# Patient Record
Sex: MALE | Race: WHITE | NOT HISPANIC OR LATINO | URBAN - METROPOLITAN AREA
[De-identification: names, ages, dates, MRNs, and addresses within clinical notes are randomized per-mention and may not be internally consistent; named-entity substitution may affect disease eponyms.]

---

## 2019-07-14 ENCOUNTER — OFFICE VISIT (OUTPATIENT)
Dept: URGENT CARE | Facility: CLINIC | Age: 59
End: 2019-07-14
Payer: COMMERCIAL

## 2019-07-14 VITALS
RESPIRATION RATE: 18 BRPM | HEIGHT: 69 IN | BODY MASS INDEX: 29.15 KG/M2 | SYSTOLIC BLOOD PRESSURE: 128 MMHG | DIASTOLIC BLOOD PRESSURE: 78 MMHG | HEART RATE: 87 BPM | TEMPERATURE: 99 F | WEIGHT: 196.8 LBS | OXYGEN SATURATION: 100 %

## 2019-07-14 DIAGNOSIS — L50.9 URTICARIA OF ENTIRE BODY: Primary | ICD-10-CM

## 2019-07-14 PROCEDURE — 99213 OFFICE O/P EST LOW 20 MIN: CPT | Performed by: FAMILY MEDICINE

## 2019-07-14 RX ORDER — ASPIRIN 81 MG/1
81 TABLET ORAL DAILY
COMMUNITY

## 2019-07-14 RX ORDER — SERTRALINE HYDROCHLORIDE 100 MG/1
100 TABLET, FILM COATED ORAL DAILY
COMMUNITY
Start: 2019-06-29

## 2019-07-14 RX ORDER — LEVOTHYROXINE SODIUM 0.07 MG/1
75 TABLET ORAL
COMMUNITY
Start: 2019-06-30

## 2019-07-14 RX ORDER — ATORVASTATIN CALCIUM 80 MG/1
TABLET, FILM COATED ORAL
COMMUNITY
Start: 2019-06-30 | End: 2022-06-17 | Stop reason: SDUPTHER

## 2019-07-15 ENCOUNTER — APPOINTMENT (OUTPATIENT)
Dept: LAB | Facility: CLINIC | Age: 59
End: 2019-07-15
Payer: COMMERCIAL

## 2019-07-15 DIAGNOSIS — L50.9 URTICARIA OF ENTIRE BODY: ICD-10-CM

## 2019-07-15 PROCEDURE — 86617 LYME DISEASE ANTIBODY: CPT

## 2019-07-15 PROCEDURE — 36415 COLL VENOUS BLD VENIPUNCTURE: CPT

## 2019-07-15 PROCEDURE — 86618 LYME DISEASE ANTIBODY: CPT

## 2019-07-16 LAB
B BURGDOR IGG SER IA-ACNC: 0.18
B BURGDOR IGM SER IA-ACNC: 17.92

## 2019-07-17 DIAGNOSIS — A69.20 LYME DISEASE: Primary | ICD-10-CM

## 2019-07-17 LAB
B BURGDOR IGG PATRN SER IB-IMP: NEGATIVE
B BURGDOR IGM PATRN SER IB-IMP: POSITIVE
B BURGDOR18KD IGG SER QL IB: ABNORMAL
B BURGDOR23KD IGG SER QL IB: PRESENT
B BURGDOR23KD IGM SER QL IB: PRESENT
B BURGDOR28KD IGG SER QL IB: PRESENT
B BURGDOR30KD IGG SER QL IB: ABNORMAL
B BURGDOR39KD IGG SER QL IB: ABNORMAL
B BURGDOR39KD IGM SER QL IB: PRESENT
B BURGDOR41KD IGG SER QL IB: PRESENT
B BURGDOR41KD IGM SER QL IB: PRESENT
B BURGDOR45KD IGG SER QL IB: ABNORMAL
B BURGDOR58KD IGG SER QL IB: ABNORMAL
B BURGDOR66KD IGG SER QL IB: ABNORMAL
B BURGDOR93KD IGG SER QL IB: ABNORMAL

## 2019-07-17 RX ORDER — DOXYCYCLINE HYCLATE 100 MG/1
100 CAPSULE ORAL EVERY 12 HOURS SCHEDULED
Qty: 28 CAPSULE | Refills: 0 | Status: SHIPPED | OUTPATIENT
Start: 2019-07-17 | End: 2019-07-31

## 2019-07-29 ENCOUNTER — TELEPHONE (OUTPATIENT)
Dept: FAMILY MEDICINE CLINIC | Facility: CLINIC | Age: 59
End: 2019-07-29

## 2019-07-29 NOTE — TELEPHONE ENCOUNTER
PT HAD WENT TO [facility] ON July 15 AND WAS TESTED FOR LYME AND IT WAS POSITIVE   HE IS ON ANTIBIOTICS AND IS ALMOST DONE AND ASKED IF YOU COULD PUT ANOTHER ORDER IN TO RETEST FOR THE LYME  PLS ADVISE

## 2019-07-29 NOTE — TELEPHONE ENCOUNTER
We have NEVER seen this patient before   Is he going to be a patient?  Is this even a correct patient

## 2019-08-06 ENCOUNTER — OFFICE VISIT (OUTPATIENT)
Dept: FAMILY MEDICINE CLINIC | Facility: CLINIC | Age: 59
End: 2019-08-06
Payer: COMMERCIAL

## 2019-08-06 VITALS
HEART RATE: 76 BPM | DIASTOLIC BLOOD PRESSURE: 70 MMHG | SYSTOLIC BLOOD PRESSURE: 120 MMHG | HEIGHT: 69 IN | OXYGEN SATURATION: 97 % | WEIGHT: 194.2 LBS | RESPIRATION RATE: 12 BRPM | BODY MASS INDEX: 28.76 KG/M2

## 2019-08-06 DIAGNOSIS — Z12.5 PROSTATE CANCER SCREENING: ICD-10-CM

## 2019-08-06 DIAGNOSIS — E78.00 PURE HYPERCHOLESTEROLEMIA: ICD-10-CM

## 2019-08-06 DIAGNOSIS — E03.9 ACQUIRED HYPOTHYROIDISM: ICD-10-CM

## 2019-08-06 DIAGNOSIS — Z12.11 COLON CANCER SCREENING: ICD-10-CM

## 2019-08-06 DIAGNOSIS — A69.20 LYME DISEASE: ICD-10-CM

## 2019-08-06 DIAGNOSIS — I25.810 CORONARY ARTERY DISEASE INVOLVING CORONARY BYPASS GRAFT OF NATIVE HEART WITHOUT ANGINA PECTORIS: ICD-10-CM

## 2019-08-06 DIAGNOSIS — F33.42 RECURRENT MAJOR DEPRESSIVE DISORDER, IN FULL REMISSION (HCC): Primary | ICD-10-CM

## 2019-08-06 PROCEDURE — 3008F BODY MASS INDEX DOCD: CPT | Performed by: FAMILY MEDICINE

## 2019-08-06 PROCEDURE — 1036F TOBACCO NON-USER: CPT | Performed by: FAMILY MEDICINE

## 2019-08-06 PROCEDURE — 99204 OFFICE O/P NEW MOD 45 MIN: CPT | Performed by: FAMILY MEDICINE

## 2019-08-06 NOTE — PROGRESS NOTES
Assessment/Plan:    No problem-specific Assessment & Plan notes found for this encounter  Diagnoses and all orders for this visit:    Recurrent major depressive disorder, in full remission ([facility] [address] )  Comments:  continue Zoloft as directed    Lyme disease  Comments:  done with doxycylcine   continue to monitor     Pure hypercholesterolemia  -     Lipid Panel with Direct LDL reflex; Future  -     Comprehensive metabolic panel  -     Ambulatory referral to Cardiology    Acquired hypothyroidism  -     CBC and differential  -     Comprehensive metabolic panel  -     TSH, 3rd generation with Free T4 reflex; Future    Prostate cancer screening  -     PSA, total and free; Future    Coronary artery disease involving coronary bypass graft of native heart without angina pectoris  -     Ambulatory referral to Cardiology  -     Echo stress test w contrast if indicated; Future    Colon cancer screening  -     Ambulatory referral to Gastroenterology; Future      BMI Counseling: Body mass index is 28 63 kg/m²  Discussed the patient's BMI with him  The BMI is above average  BMI counseling and education was provided to the patient  Nutrition recommendations include decreasing overall calorie intake, reducing fast food intake and consuming healthier snacks  Exercise recommendations include moderate aerobic physical activity for 150 minutes/week  Subjective:      Patient ID: Vivian Timmons is a 63 y o  male  Here as a new patient   Has been on zoloft for 20 years  Recently diagnosed with Lyme disease last month   Moved from [place] recently   Had 4 bypass surgery of the heart   Last seen by cardiology 1 year ago     Hyperlipidemia   This is a new problem  The current episode started more than 1 year ago  The problem is controlled  He has no history of chronic renal disease, diabetes, hypothyroidism, liver disease, obesity or nephrotic syndrome   Pertinent negatives include no chest pain, focal sensory loss, focal weakness, leg pain, myalgias or shortness of breath  The current treatment provides mild improvement of lipids  There are no compliance problems  Anxiety   Presents for follow-up visit  Patient reports no chest pain, compulsions, confusion, excessive worry, feeling of choking, hyperventilation, impotence, insomnia, irritability, nervous/anxious behavior, obsessions, palpitations, panic, restlessness or shortness of breath  The following portions of the patient's history were reviewed and updated as appropriate: allergies, current medications, past family history, past medical history, past social history, past surgical history and problem list     Review of Systems   Constitutional: Negative  Negative for irritability  HENT: Negative  Eyes: Negative  Respiratory: Negative  Negative for shortness of breath  Cardiovascular: Negative  Negative for chest pain and palpitations  Gastrointestinal: Negative  Genitourinary: Negative for impotence  Musculoskeletal: Negative for myalgias  Neurological: Negative for focal weakness  Psychiatric/Behavioral: Negative for confusion  The patient is not nervous/anxious and does not have insomnia  Objective:      /70 (BP Location: Right arm, Patient Position: Sitting, Cuff Size: Standard)   Pulse 76   Resp 12   Ht 5' 9 06" (1 754 m)   Wt 88 1 kg (194 lb 3 2 oz)   SpO2 97%   BMI 28 63 kg/m²          Physical Exam   Constitutional: He is oriented to person, place, and time  He appears well-developed and well-nourished  Eyes: Pupils are equal, round, and reactive to light  EOM are normal    Neck: No tracheal deviation present  No thyromegaly present  Cardiovascular: Normal rate and regular rhythm  Pulmonary/Chest: Effort normal and breath sounds normal    Abdominal: Soft  Bowel sounds are normal  He exhibits no distension  Musculoskeletal: Normal range of motion  He exhibits no edema or deformity     Neurological: He is alert and oriented to person, place, and time     Skin:   Surgical scars bilateral lower legs

## 2019-09-19 ENCOUNTER — TELEPHONE (OUTPATIENT)
Dept: GASTROENTEROLOGY | Facility: CLINIC | Age: 59
End: 2019-09-19

## 2019-10-11 ENCOUNTER — ANESTHESIA EVENT (OUTPATIENT)
Dept: GASTROENTEROLOGY | Facility: MEDICAL CENTER | Age: 59
End: 2019-10-11

## 2019-10-11 DIAGNOSIS — Z12.11 COLON CANCER SCREENING: Primary | ICD-10-CM

## 2019-10-11 NOTE — TELEPHONE ENCOUNTER
Colon scheduled with Dr Jaiyeola at [facility] on 10/30/2019  I gave patient verbal instructions/emailed to [email or web address]  com  Prep Suprep

## 2019-10-30 ENCOUNTER — HOSPITAL ENCOUNTER (OUTPATIENT)
Dept: GASTROENTEROLOGY | Facility: MEDICAL CENTER | Age: 59
Setting detail: OUTPATIENT SURGERY
Discharge: HOME | End: 2019-10-30
Admitting: INTERNAL MEDICINE
Payer: COMMERCIAL

## 2019-10-30 ENCOUNTER — ANESTHESIA (OUTPATIENT)
Dept: GASTROENTEROLOGY | Facility: MEDICAL CENTER | Age: 59
End: 2019-10-30

## 2019-10-30 VITALS
HEART RATE: 50 BPM | WEIGHT: 197 LBS | TEMPERATURE: 97.5 F | DIASTOLIC BLOOD PRESSURE: 67 MMHG | BODY MASS INDEX: 29.18 KG/M2 | OXYGEN SATURATION: 97 % | SYSTOLIC BLOOD PRESSURE: 122 MMHG | RESPIRATION RATE: 22 BRPM | HEIGHT: 69 IN

## 2019-10-30 DIAGNOSIS — Z12.11 COLON CANCER SCREENING: ICD-10-CM

## 2019-10-30 PROCEDURE — 88305 TISSUE EXAM BY PATHOLOGIST: CPT | Performed by: PATHOLOGY

## 2019-10-30 PROCEDURE — 45381 COLONOSCOPY SUBMUCOUS NJX: CPT | Performed by: INTERNAL MEDICINE

## 2019-10-30 PROCEDURE — 45380 COLONOSCOPY AND BIOPSY: CPT | Performed by: INTERNAL MEDICINE

## 2019-10-30 RX ORDER — SODIUM CHLORIDE 9 MG/ML
125 INJECTION, SOLUTION INTRAVENOUS CONTINUOUS
Status: DISCONTINUED | OUTPATIENT
Start: 2019-10-30 | End: 2019-11-03 | Stop reason: HOSPADM

## 2019-10-30 RX ORDER — PROPOFOL 10 MG/ML
INJECTION, EMULSION INTRAVENOUS AS NEEDED
Status: DISCONTINUED | OUTPATIENT
Start: 2019-10-30 | End: 2019-10-30 | Stop reason: SURG

## 2019-10-30 RX ADMIN — PROPOFOL 50 MG: 10 INJECTION, EMULSION INTRAVENOUS at 08:49

## 2019-10-30 RX ADMIN — PROPOFOL 70 MG: 10 INJECTION, EMULSION INTRAVENOUS at 08:30

## 2019-10-30 RX ADMIN — PROPOFOL 50 MG: 10 INJECTION, EMULSION INTRAVENOUS at 09:08

## 2019-10-30 RX ADMIN — PROPOFOL 50 MG: 10 INJECTION, EMULSION INTRAVENOUS at 08:44

## 2019-10-30 RX ADMIN — PROPOFOL 50 MG: 10 INJECTION, EMULSION INTRAVENOUS at 09:02

## 2019-10-30 RX ADMIN — Medication 40 MG: at 08:31

## 2019-10-30 RX ADMIN — SODIUM CHLORIDE 125 ML/HR: 0.9 INJECTION, SOLUTION INTRAVENOUS at 08:09

## 2019-10-30 RX ADMIN — PROPOFOL 130 MG: 10 INJECTION, EMULSION INTRAVENOUS at 08:23

## 2019-10-30 RX ADMIN — PROPOFOL 50 MG: 10 INJECTION, EMULSION INTRAVENOUS at 08:55

## 2019-10-30 RX ADMIN — PROPOFOL 50 MG: 10 INJECTION, EMULSION INTRAVENOUS at 09:12

## 2019-10-30 RX ADMIN — PROPOFOL 50 MG: 10 INJECTION, EMULSION INTRAVENOUS at 08:27

## 2019-10-30 RX ADMIN — PROPOFOL 50 MG: 10 INJECTION, EMULSION INTRAVENOUS at 08:39

## 2019-10-30 RX ADMIN — PROPOFOL 50 MG: 10 INJECTION, EMULSION INTRAVENOUS at 08:35

## 2019-10-30 RX ADMIN — PROPOFOL 50 MG: 10 INJECTION, EMULSION INTRAVENOUS at 09:17

## 2019-10-30 NOTE — ANESTHESIA PREPROCEDURE EVALUATION
Review of Systems/Medical History  Patient summary reviewed  Chart reviewed  No history of anesthetic complications     Cardiovascular  Exercise tolerance (METS): >4,  Hyperlipidemia, CAD ,   Comment: Pt sees cardiology in [place]  Last stress 2018, negative for ischemia per pt  Pt works at [employer] and is constantly on his feet,  Pulmonary  Smoker ex-smoker  ,        GI/Hepatic  Negative GI/hepatic ROS          Negative  ROS        Endo/Other  History of thyroid disease , hypothyroidism,      GYN  Negative gynecology ROS          Hematology  Negative hematology ROS      Musculoskeletal  Negative musculoskeletal ROS        Neurology  Negative neurology ROS      Psychology   Anxiety, Depression , being treated for depression,              Physical Exam    Airway    Mallampati score: I  TM Distance: >3 FB  Neck ROM: full     Dental   Comment: bridgework,     Cardiovascular  Rhythm: regular, Rate: normal,     Pulmonary  Breath sounds clear to auscultation,     Other Findings        Anesthesia Plan  ASA Score- 3     Anesthesia Type- IV sedation with anesthesia with ASA Monitors  Additional Monitors:   Airway Plan:         Plan Factors-  Patient did not smoke on day of surgery  Induction- intravenous  Postoperative Plan-     Informed Consent- Anesthetic plan and risks discussed with patient

## 2019-10-30 NOTE — DISCHARGE INSTRUCTIONS
Colonoscopy   WHAT YOU NEED TO KNOW:   A colonoscopy is a procedure to examine the inside of your colon (intestine) with a scope  Polyps or tissue growths may have been removed during your colonoscopy  It is normal to feel bloated and to have some abdominal discomfort  You should be passing gas  If you have hemorrhoids or you had polyps removed, you may have a small amount of bleeding  DISCHARGE INSTRUCTIONS:   Seek care immediately if:   · You have a large amount of bright red blood in your bowel movements  · Your abdomen is hard and firm and you have severe pain  · You have sudden trouble breathing  Contact your healthcare provider if:   · You develop a rash or hives  · You have a fever within 24 hours of your procedure  · You have not had a bowel movement for 3 days after your procedure  · You have questions or concerns about your condition or care  Activity:   · Do not lift, strain, or run  for 3 days after your procedure  · Rest after your procedure  You have been given medicine to relax you  Do not  drive or make important decisions until the day after your procedure  Return to your normal activity as directed  · Relieve gas and discomfort from bloating  by lying on your right side with a heating pad on your abdomen  You may need to take short walks to help the gas move out  Eat small meals until bloating is relieved  If you had polyps removed: For 7 days after your procedure:  · Do not  take aspirin  · Do not  go on long car rides  Help prevent constipation:   · Eat a variety of healthy foods  Healthy foods include fruit, vegetables, whole-grain breads, low-fat dairy products, beans, lean meat, and fish  Ask if you need to be on a special diet  Your healthcare provider may recommend that you eat high-fiber foods such as cooked beans  Fiber helps you have regular bowel movements  · Drink liquids as directed    Adults should drink between 9 and 13 eight-ounce cups of liquid every day  Ask what amount is best for you  For most people, good liquids to drink are water, juice, and milk  · Exercise as directed  Talk to your healthcare provider about the best exercise plan for you  Exercise can help prevent constipation, decrease your blood pressure and improve your health  Follow up with your healthcare provider as directed:  Write down your questions so you remember to ask them during your visits  © 2017 [employer] Information is for End User's use only and may not be sold, redistributed or otherwise used for commercial purposes  All illustrations and images included in CareNotes® are the copyrighted property of A D A M [employer] Inc  or Jose Torres  The above information is an  only  It is not intended as medical advice for individual conditions or treatments  Talk to your doctor, nurse or pharmacist before following any medical regimen to see if it is safe and effective for you  Colorectal Polyps   WHAT YOU NEED TO KNOW:   What are colorectal polyps? Colorectal polyps are small growths of tissue in the lining of the colon and rectum  Most polyps are hyperplastic polyps and are usually benign (noncancerous)  Certain types of polyps, called adenomatous polyps, may turn into cancer  What increases my risk of colorectal polyps? The exact cause of colorectal polyps is unknown  The following may increase your risk:  · Older age    · A diet of foods high in fat and low in fiber     · Family history of polyps    · Intestinal diseases, such as Crohn's disease or ulcerative colitis    · An unhealthy lifestyle, such as physical inactivity, smoking, or drinking alcohol    · Obesity  What are the signs and symptoms of colorectal polyps? · Blood in your bowel movement or bleeding from the rectum    · Change in bowel movement habits, such as diarrhea and constipation    · Abdominal pain  How are colorectal polyps diagnosed?   You should have fecal blood screening once a year for colorectal disease if you are over 48years old  You should be screened earlier if you have an intestinal disease or a family history of polyps or colorectal cancer  During this screening, a sample of your bowel movement is checked for blood, which may be an early sign of colorectal polyps or cancer  You may also need any of the following tests:  · Digital rectal exam:  Your healthcare provider will examine your anus and use a finger to check your rectum for polyps  · Barium enema: A barium enema is an x-ray of the colon  A tube is put into your anus, and a liquid called barium is put through the tube  Barium is used so that caregivers can see your colon better on the x-ray film  · Virtual colonoscopy: This is a CT scan that takes pictures of the inside of your colon and rectum  A small, flexible tube is put into your rectum and air or carbon dioxide (gas) is used to expand your colon  This lets healthcare providers clearly see your colon and any polyps on a monitor  · Colonoscopy or sigmoidoscopy: These procedures help your healthcare provider see the inside of your colon using a flexible tube with a small light and camera on the end  During a sigmoidoscopy, your healthcare provider will only look at rectum and lower colon  During a colonoscopy, healthcare providers will look at the full length of your colon  Healthcare providers may remove a small amount of tissue from the colon for a biopsy  How are colorectal polyps treated? · Polyp removal:  Polyps may be removed during your sigmoidoscopy or colonoscopy  · Polypectomy: This is surgery to remove your polyps  You may need laparoscopic or open surgery, depending on the type, size, and number of polyps that you have  Laparoscopy is done by inserting a small, flexible scope into incisions made on your abdomen  Open surgery is done by making a larger incision on your abdomen     What are the risks of colorectal polyps? You may bleed during a colonoscopy procedure  Your bowel may be perforated (torn) when polyps are removed  This may lead to an open abdominal surgery  During surgery, you may bleed too much or get an infection  Adenomatous polyps that are not removed may turn into cancer and become more difficult to treat  Where can I find support and more information? · [address] ([facility] [place]  [address] , [place] [address]  Phone: [phone]  Web Address: Bonifacio Haynes  [facility] gov  When should I contact my healthcare provider? · You have a fever  · You have chills, a cough, or feel weak and achy  · You have abdominal pain that does not go away or gets worse after you take medicine  · Your abdomen is swollen  · You are losing weight without trying  · You have questions or concerns about your condition or care  When should I seek immediate care or call 911? · You have sudden shortness of breath  · You have a fast heart rate, fast breathing, or are too dizzy to stand up  · You have severe abdominal pain  · You see blood in your bowel movement  CARE AGREEMENT:   You have the right to help plan your care  Learn about your health condition and how it may be treated  Discuss treatment options with your caregivers to decide what care you want to receive  You always have the right to refuse treatment  The above information is an  only  It is not intended as medical advice for individual conditions or treatments  Talk to your doctor, nurse or pharmacist before following any medical regimen to see if it is safe and effective for you  © 2017 [employer]  Information is for End User's use only and may not be sold, redistributed or otherwise used for commercial purposes   All illustrations and images included in CareNotes® are the copyrighted property of [employer]  or [facility] [employer]

## 2019-11-04 ENCOUNTER — TELEPHONE (OUTPATIENT)
Dept: GASTROENTEROLOGY | Facility: AMBULARY SURGERY CENTER | Age: 59
End: 2019-11-04

## 2019-11-04 NOTE — TELEPHONE ENCOUNTER
Patients GI provider:  Christen Davidson    Number to return call: [phone]    Reason for call: Pt is returning missed call for results  Scheduled procedure/appointment date if applicable: Apt/procedure   n/a

## 2019-11-05 ENCOUNTER — TELEPHONE (OUTPATIENT)
Dept: GASTROENTEROLOGY | Facility: MEDICAL CENTER | Age: 59
End: 2019-11-05

## 2019-11-05 NOTE — RESULT ENCOUNTER NOTE
I called patient with the results  Ascending colon polyp was tubular adenoma  Hepatic flexure polyp biopsy was also tubular adenoma  This was the polyp that did not lift well during his colonoscopy  I have sent a message to TERRI to schedule colonoscopy with one of our advance endoscopist for polypectomy  I suspect there is scarring at this site given that it is near his prior polypectomy with tattooing  The patient verbalized understanding of these instructions

## 2019-11-09 ENCOUNTER — APPOINTMENT (OUTPATIENT)
Dept: LAB | Facility: CLINIC | Age: 59
End: 2019-11-09
Payer: COMMERCIAL

## 2019-11-09 DIAGNOSIS — E03.9 ACQUIRED HYPOTHYROIDISM: ICD-10-CM

## 2019-11-09 DIAGNOSIS — E78.00 PURE HYPERCHOLESTEROLEMIA: ICD-10-CM

## 2019-11-09 LAB
CHOLEST SERPL-MCNC: 128 MG/DL (ref 50–200)
HDLC SERPL-MCNC: 30 MG/DL
LDLC SERPL CALC-MCNC: 54 MG/DL (ref 0–100)
TRIGL SERPL-MCNC: 220 MG/DL
TSH SERPL DL<=0.05 MIU/L-ACNC: 3.05 UIU/ML (ref 0.36–3.74)

## 2019-11-09 PROCEDURE — 84443 ASSAY THYROID STIM HORMONE: CPT

## 2019-11-09 PROCEDURE — 36415 COLL VENOUS BLD VENIPUNCTURE: CPT

## 2019-11-09 PROCEDURE — 80061 LIPID PANEL: CPT

## 2019-11-13 ENCOUNTER — OFFICE VISIT (OUTPATIENT)
Dept: FAMILY MEDICINE CLINIC | Facility: CLINIC | Age: 59
End: 2019-11-13
Payer: COMMERCIAL

## 2019-11-13 VITALS
HEIGHT: 70 IN | DIASTOLIC BLOOD PRESSURE: 82 MMHG | BODY MASS INDEX: 28.66 KG/M2 | WEIGHT: 200.2 LBS | SYSTOLIC BLOOD PRESSURE: 120 MMHG | HEART RATE: 64 BPM | TEMPERATURE: 97.1 F | RESPIRATION RATE: 16 BRPM | OXYGEN SATURATION: 98 %

## 2019-11-13 DIAGNOSIS — Z23 NEED FOR DIPHTHERIA-TETANUS-PERTUSSIS (TDAP) VACCINE: ICD-10-CM

## 2019-11-13 DIAGNOSIS — E78.00 PURE HYPERCHOLESTEROLEMIA: ICD-10-CM

## 2019-11-13 DIAGNOSIS — Z23 FLU VACCINE NEED: ICD-10-CM

## 2019-11-13 DIAGNOSIS — Z00.00 ANNUAL PHYSICAL EXAM: Primary | ICD-10-CM

## 2019-11-13 PROCEDURE — 99396 PREV VISIT EST AGE 40-64: CPT | Performed by: FAMILY MEDICINE

## 2019-11-13 PROCEDURE — 90472 IMMUNIZATION ADMIN EACH ADD: CPT

## 2019-11-13 PROCEDURE — 90471 IMMUNIZATION ADMIN: CPT

## 2019-11-13 PROCEDURE — 90682 RIV4 VACC RECOMBINANT DNA IM: CPT

## 2019-11-13 PROCEDURE — 90715 TDAP VACCINE 7 YRS/> IM: CPT

## 2019-11-13 NOTE — PATIENT INSTRUCTIONS

## 2019-11-13 NOTE — PROGRESS NOTES
[address]    NAME: Rafa Kaur  AGE: 63 y o  SEX: male  : 1960     DATE: 2019     Assessment and Plan:     Problem List Items Addressed This Visit        Other    Pure hypercholesterolemia    Relevant Orders    Comprehensive metabolic panel    Lipid Panel with Direct LDL reflex      Other Visit Diagnoses     Annual physical exam    -  Primary    Need for diphtheria-tetanus-pertussis (Tdap) vaccine        Relevant Orders    TDAP VACCINE GREATER THAN OR EQUAL TO 8YO IM    Flu vaccine need        Relevant Orders    influenza vaccine, [id number], quadrivalent, recombinant, PF, 0 5 mL, for patients 18 yr+ (FLUBLOK)          Immunizations and preventive care screenings were discussed with patient today  Appropriate education was printed on patient's after visit summary  Counseling:  Alcohol/drug use: discussed moderation in alcohol intake, the recommendations for healthy alcohol use, and avoidance of illicit drug use  Dental Health: discussed importance of regular tooth brushing, flossing, and dental visits  Injury prevention: discussed safety/seat belts, safety helmets, smoke detectors, carbon dioxide detectors, and smoking near bedding or upholstery  Sexual health: discussed sexually transmitted diseases, partner selection, use of condoms, avoidance of unintended pregnancy, and contraceptive alternatives  · Exercise: the importance of regular exercise/physical activity was discussed  Recommend exercise 3-5 times per week for at least 30 minutes  No follow-ups on file  Chief Complaint:     Chief Complaint   Patient presents with    Annual Exam     patient is here for physical exam      History of Present Illness:     Adult Annual Physical   Patient here for a comprehensive physical exam  The patient reports no problems      Diet and Physical Activity  · Diet/Nutrition: well balanced diet and consuming 3-5 servings of fruits/vegetables daily  · Exercise: walking and 5-7 times a week on average  Depression Screening  PHQ-9 Depression Screening    PHQ-9:    Frequency of the following problems over the past two weeks:            General Health  · Sleep: sleeps well  · Hearing: normal - bilateral   · Vision: most recent eye exam >1 year ago and wears glasses  · Dental: regular dental visits and brushes teeth twice daily   Health  · Symptoms include: none   ·      Review of Systems:     Review of Systems   Constitutional: Negative  HENT: Negative  Eyes: Negative  Respiratory: Negative  Cardiovascular: Negative  Gastrointestinal: Negative  Endocrine: Negative  Genitourinary: Negative  Musculoskeletal: Negative  Skin: Negative  Allergic/Immunologic: Negative  Neurological: Negative  Hematological: Negative  Psychiatric/Behavioral: Negative         Past Medical History:     Past Medical History:   Diagnosis Date    Anxiety     CAD (coronary artery disease)     Colon polyp     Hyperlipidemia       Past Surgical History:     Past Surgical History:   Procedure Laterality Date    COLONOSCOPY      CORONARY ARTERY BYPASS GRAFT      2011      Family History:     Family History   Problem Relation Age of Onset    Diabetes Mother     Lung cancer Mother     No Known Problems Father       Social History:     Social History     Socioeconomic History    Marital status: Unknown     Spouse name: None    Number of children: None    Years of education: None    Highest education level: None   Occupational History    None   Social Needs    Financial resource strain: None    Food insecurity:     Worry: None     Inability: None    Transportation needs:     Medical: None     Non-medical: None   Tobacco Use    Smoking status: Former Smoker     Packs/day: 1 00     Years: 12 00     Pack years: 12 00    Smokeless tobacco: Never Used   Substance and Sexual Activity    Alcohol use: Never     Frequency: Never    Drug use: Never    Sexual activity: None   Lifestyle    Physical activity:     Days per week: None     Minutes per session: None    Stress: None   Relationships    Social connections:     Talks on phone: None     Gets together: None     Attends Congregation service: None     Active member of club or organization: None     Attends meetings of clubs or organizations: None     Relationship status: None    Intimate partner violence:     Fear of current or ex partner: None     Emotionally abused: None     Physically abused: None     Forced sexual activity: None   Other Topics Concern    None   Social History Narrative    None      Current Medications:     Current Outpatient Medications   Medication Sig Dispense Refill    aspirin (ECOTRIN LOW STRENGTH) 81 mg EC tablet Take 81 mg by mouth daily      atorvastatin (LIPITOR) 80 mg tablet       levothyroxine 75 mcg tablet       Na Sulfate-K Sulfate-Mg Sulf 17 5-3 13-1 6 GM/177ML SOLN Take as directed by the office  2 Bottle 0    sertraline (ZOLOFT) 100 mg tablet        No current facility-administered medications for this visit  Allergies:     No Known Allergies   Physical Exam:     /82 (BP Location: Left arm, Patient Position: Sitting, Cuff Size: Large)   Pulse 64   Temp (!) 97 1 °F (36 2 °C) (Tympanic)   Resp 16   Ht 5' 10 32" (1 786 m)   Wt 90 8 kg (200 lb 3 2 oz)   SpO2 98%   BMI 28 47 kg/m²     Physical Exam   Constitutional: He is oriented to person, place, and time  Vital signs are normal  He appears well-developed and well-nourished  HENT:   Head: Normocephalic and atraumatic  Nose: Nose normal    Mouth/Throat: Oropharynx is clear and moist    Eyes: Pupils are equal, round, and reactive to light  Neck: Normal range of motion  Neck supple  No thyromegaly present  Cardiovascular: Normal rate and regular rhythm  No murmur heard    Pulmonary/Chest: Effort normal and breath sounds normal    Abdominal: Soft  Bowel sounds are normal    Musculoskeletal: Normal range of motion  He exhibits no edema or deformity  Neurological: He is alert and oriented to person, place, and time  He has normal reflexes  No cranial nerve deficit  Coordination normal    Skin: Skin is warm  No rash noted  No erythema  Psychiatric: He has a normal mood and affect   His behavior is normal        Liberty Lehman MD  [address] [facility]

## 2020-01-02 ENCOUNTER — TELEPHONE (OUTPATIENT)
Dept: GASTROENTEROLOGY | Facility: CLINIC | Age: 60
End: 2020-01-02

## 2020-01-02 NOTE — TELEPHONE ENCOUNTER
----- Message from Yuly Oliva MD sent at 12/4/2019  5:17 PM EST -----  January  Thanks  Lilian Martinez    ----- Message -----  From: Krishna Price MA  Sent: 12/4/2019   2:59 PM EST  To: Yuly Oliva MD    Do you have any suggestions?  ----- Message -----  From: Yuly Oliva MD  Sent: 11/4/2019   4:14 PM EST  To: Krishna Price MA, Jaime Thomas MD    Sure thing  Alan Parikh - any thoughts  Thanks  Lilian Martinez    ----- Message -----  From: Jaime Thomas MD  Sent: 11/4/2019   2:49 PM EST  To: Krishna Price MA, Yuly Oliva MD, #    [place] Alan Parikh,    Can you schedule this patient with Dr Analia Davison or Dr Griselda Brady for colonoscopy? Hector Jorge: he has a 3cm hepatic flexure polyp near a prior polypectomy site that was previously tattooed  I tried to lift it with saline and orise and it didn't lift well  I biopsied the periphery which came back as tubular adenoma without dysplasia  I think it didn't lift due to scarring from the prior polyp resection  Do one of you mind trying EMR on it?      Thanks,    Alan Mandel

## 2020-01-16 DIAGNOSIS — K63.5 POLYP OF COLON, UNSPECIFIED PART OF COLON, UNSPECIFIED TYPE: Primary | ICD-10-CM

## 2020-01-16 NOTE — TELEPHONE ENCOUNTER
Colon EMR scheduled with Dr Devine in [facility] on 2/27/20  Martha requested to be scheduled in Feb due to work schedule  I gave patient verbal instructions/mailed

## 2020-02-21 ENCOUNTER — TELEPHONE (OUTPATIENT)
Dept: GASTROENTEROLOGY | Facility: CLINIC | Age: 60
End: 2020-02-21

## 2020-02-24 ENCOUNTER — TELEPHONE (OUTPATIENT)
Dept: GASTROENTEROLOGY | Facility: CLINIC | Age: 60
End: 2020-02-24

## 2020-02-26 ENCOUNTER — ANESTHESIA EVENT (OUTPATIENT)
Dept: GASTROENTEROLOGY | Facility: HOSPITAL | Age: 60
End: 2020-02-26

## 2020-02-27 ENCOUNTER — HOSPITAL ENCOUNTER (OUTPATIENT)
Dept: GASTROENTEROLOGY | Facility: HOSPITAL | Age: 60
Setting detail: OUTPATIENT SURGERY
Discharge: HOME | End: 2020-02-27
Attending: INTERNAL MEDICINE | Admitting: INTERNAL MEDICINE
Payer: COMMERCIAL

## 2020-02-27 ENCOUNTER — ANESTHESIA (OUTPATIENT)
Dept: GASTROENTEROLOGY | Facility: HOSPITAL | Age: 60
End: 2020-02-27

## 2020-02-27 VITALS
TEMPERATURE: 96 F | HEART RATE: 62 BPM | RESPIRATION RATE: 18 BRPM | BODY MASS INDEX: 28.14 KG/M2 | SYSTOLIC BLOOD PRESSURE: 133 MMHG | WEIGHT: 190 LBS | DIASTOLIC BLOOD PRESSURE: 75 MMHG | OXYGEN SATURATION: 98 % | HEIGHT: 69 IN

## 2020-02-27 DIAGNOSIS — K63.5 POLYP OF COLON, UNSPECIFIED PART OF COLON, UNSPECIFIED TYPE: ICD-10-CM

## 2020-02-27 PROCEDURE — 88305 TISSUE EXAM BY PATHOLOGIST: CPT | Performed by: PATHOLOGY

## 2020-02-27 PROCEDURE — 45390 COLONOSCOPY W/RESECTION: CPT | Performed by: INTERNAL MEDICINE

## 2020-02-27 RX ORDER — PROPOFOL 10 MG/ML
INJECTION, EMULSION INTRAVENOUS CONTINUOUS PRN
Status: DISCONTINUED | OUTPATIENT
Start: 2020-02-27 | End: 2020-02-27 | Stop reason: SURG

## 2020-02-27 RX ORDER — SODIUM CHLORIDE 9 MG/ML
INJECTION, SOLUTION INTRAVENOUS CONTINUOUS PRN
Status: DISCONTINUED | OUTPATIENT
Start: 2020-02-27 | End: 2020-02-27 | Stop reason: SURG

## 2020-02-27 RX ORDER — PROPOFOL 10 MG/ML
INJECTION, EMULSION INTRAVENOUS AS NEEDED
Status: DISCONTINUED | OUTPATIENT
Start: 2020-02-27 | End: 2020-02-27 | Stop reason: SURG

## 2020-02-27 RX ADMIN — SODIUM CHLORIDE: 0.9 INJECTION, SOLUTION INTRAVENOUS at 08:46

## 2020-02-27 RX ADMIN — PHENYLEPHRINE HYDROCHLORIDE 100 MCG: 10 INJECTION INTRAVENOUS at 09:33

## 2020-02-27 RX ADMIN — PHENYLEPHRINE HYDROCHLORIDE 100 MCG: 10 INJECTION INTRAVENOUS at 09:04

## 2020-02-27 RX ADMIN — SODIUM CHLORIDE: 0.9 INJECTION, SOLUTION INTRAVENOUS at 09:12

## 2020-02-27 RX ADMIN — PROPOFOL 120 MCG/KG/MIN: 10 INJECTION, EMULSION INTRAVENOUS at 08:49

## 2020-02-27 RX ADMIN — PROPOFOL 100 MG: 10 INJECTION, EMULSION INTRAVENOUS at 08:49

## 2020-02-27 NOTE — H&P
History and Physical - [facility]  Elder Rodriguez 63 y o  male MRN: [id number]    HPI: Elder Rodriguez is a 63y o  year old male who presents with hepatic flexure colon polyp (TA) possible prior resection  Recent attempt was unsuccessful  Now presents for repeat attempt at EMR of hte polyp  Review of Systems    Historical Information   Past Medical History:   Diagnosis Date    Anxiety     CAD (coronary artery disease)     Colon polyp     Hyperlipidemia      Past Surgical History:   Procedure Laterality Date    COLONOSCOPY      CORONARY ARTERY BYPASS GRAFT      2011     Social History   Social History     Substance and Sexual Activity   Alcohol Use Never    Frequency: Never     Social History     Substance and Sexual Activity   Drug Use Never     Social History     Tobacco Use   Smoking Status Former Smoker    Packs/day: 1 00    Years: 12 00    Pack years: 12 00   Smokeless Tobacco Never Used     Family History   Problem Relation Age of Onset    Diabetes Mother     Lung cancer Mother     No Known Problems Father        Meds/Allergies       (Not in a hospital admission)    No Known Allergies    Objective     There were no vitals taken for this visit  PHYSICAL EXAM    Gen: NAD  CV: RRR  CHEST: Clear  ABD: soft, NT/ND  EXT: no edema  Neuro: AAO      ASSESSMENT/PLAN:  This is a 63y o  year old male here for EMR of colon polyp at hepatic flexure       PLAN:   Procedure: colonosocpy Maryann Rogel

## 2020-02-27 NOTE — ANESTHESIA PREPROCEDURE EVALUATION
Review of Systems/Medical History  Patient summary reviewed  Chart reviewed  No history of anesthetic complications     Cardiovascular  Negative cardio ROS Exercise tolerance (METS): >4,  Hyperlipidemia, CAD , History of CABG,    Pulmonary  Negative pulmonary ROS Smoker ex-smoker  ,        GI/Hepatic  Negative GI/hepatic ROS          Negative  ROS        Endo/Other  Negative endo/other ROS History of thyroid disease , hypothyroidism,      GYN  Negative gynecology ROS          Hematology  Negative hematology ROS      Musculoskeletal  Negative musculoskeletal ROS        Neurology  Negative neurology ROS      Psychology   Negative psychology ROS Anxiety, Depression , depressed,          Colonoscopy 10/30/19: IV sedation    Last stress 2018, negative for ischemia per patient    No results found for this or any previous visit (from the past 1008 hour(s))  Physical Exam    Airway    Mallampati score: I  TM Distance: >3 FB  Neck ROM: full     Dental   No notable dental hx     Cardiovascular  Comment: Negative ROS, Rhythm: regular, Rate: normal, No murmur,     Pulmonary  Breath sounds clear to auscultation,     Other Findings        Anesthesia Plan  ASA Score- 3     Anesthesia Type- IV sedation with anesthesia with ASA Monitors  Additional Monitors:   Airway Plan:         Plan Factors-    Induction-     Postoperative Plan-     Informed Consent- Anesthetic plan and risks discussed with patient  I personally reviewed this patient with the CRNA  Discussed and agreed on the Anesthesia Plan with the CRNA  Dianna Gotti

## 2020-02-27 NOTE — ANESTHESIA POSTPROCEDURE EVALUATION
Post-Op Assessment Note    CV Status:  Stable    Pain management: adequate     Mental Status:  Alert and awake   Hydration Status:  Euvolemic   PONV Controlled:  Controlled   Airway Patency:  Patent   Post Op Vitals Reviewed: Yes      Staff: CRNA   Comments: Pt able to maintain own airway, VSS on RA, report to GI recovery RN          BP   103/72   Temp     Pulse  69   Resp   18   SpO2   99%

## 2020-03-13 ENCOUNTER — TELEPHONE (OUTPATIENT)
Dept: GASTROENTEROLOGY | Facility: CLINIC | Age: 60
End: 2020-03-13

## 2020-03-13 NOTE — TELEPHONE ENCOUNTER
----- Message from Carina Babin MD sent at 3/13/2020  8:10 AM EDT -----  Please call patient :  Benign but precancerous colon polyp  Removed  Repeat colonoscopy in 1 year

## 2020-09-18 ENCOUNTER — OFFICE VISIT (OUTPATIENT)
Dept: URGENT CARE | Facility: CLINIC | Age: 60
End: 2020-09-18
Payer: COMMERCIAL

## 2020-09-18 VITALS
OXYGEN SATURATION: 95 % | HEIGHT: 69 IN | HEART RATE: 99 BPM | DIASTOLIC BLOOD PRESSURE: 79 MMHG | BODY MASS INDEX: 28.14 KG/M2 | RESPIRATION RATE: 18 BRPM | WEIGHT: 190 LBS | TEMPERATURE: 101 F | SYSTOLIC BLOOD PRESSURE: 145 MMHG

## 2020-09-18 DIAGNOSIS — R05.9 COUGH: ICD-10-CM

## 2020-09-18 DIAGNOSIS — R50.9 FEVER, UNSPECIFIED: Primary | ICD-10-CM

## 2020-09-18 PROCEDURE — 3725F SCREEN DEPRESSION PERFORMED: CPT | Performed by: PHYSICIAN ASSISTANT

## 2020-09-18 PROCEDURE — 1036F TOBACCO NON-USER: CPT | Performed by: PHYSICIAN ASSISTANT

## 2020-09-18 PROCEDURE — U0003 INFECTIOUS AGENT DETECTION BY NUCLEIC ACID (DNA OR RNA); SEVERE ACUTE RESPIRATORY SYNDROME CORONAVIRUS 2 (SARS-COV-2) (CORONAVIRUS DISEASE [COVID-19]), AMPLIFIED PROBE TECHNIQUE, MAKING USE OF HIGH THROUGHPUT TECHNOLOGIES AS DESCRIBED BY CMS-2020-01-R: HCPCS | Performed by: PHYSICIAN ASSISTANT

## 2020-09-18 PROCEDURE — 99213 OFFICE O/P EST LOW 20 MIN: CPT | Performed by: PHYSICIAN ASSISTANT

## 2020-09-18 NOTE — PATIENT INSTRUCTIONS

## 2020-09-18 NOTE — LETTER
September 18, 2020     Patient: Dave Maddox   YOB: 1960   Date of Visit: 9/18/2020       To Whom it May Concern: Dave Maddox was seen in my clinic on 9/18/2020  He has been instructed to self-isolate until further notice  If you have any questions or concerns, please don't hesitate to call  Sincerely,          David Ryan PA-C        CC:  Dave Varsha

## 2020-09-20 LAB — SARS-COV-2 RNA SPEC QL NAA+PROBE: NOT DETECTED

## 2020-09-24 NOTE — PROGRESS NOTES
[facility]        NAME: Elmo Veliz is a 62 y o  male  : 1960    MRN: [id number]  DATE: 2020  TIME: 10:40 AM    Assessment and Plan   Fever, unspecified [R50 9]  1  Fever, unspecified  Novel Coronavirus (COVID-19), PCR [facility] - Office Collection   2  Cough  Novel Coronavirus (COVID-19), PCR [facility] - Office Collection         Patient Instructions     Discussed condition with patient  He has recent onset of fever and cough approximately 2 weeks after returning from a trip to [place] with concerns for COVID-19  He will be swab today and quarantine instructions were given  He will be notified of results obtained  In the interim, he is to hydrate, rest, discussed fever management, OTC cough meds, and close observation  Follow up with PCP in 3-5 days  Proceed to  ER if symptoms worsen  Chief Complaint     Chief Complaint   Patient presents with    Cough     COUGH FOR JUST TODAY AND FEVER STARTED 1 HOUR AGO  RETURNED FROM [place]   History of Present Illness       Patient presents with recent onset of cough and fever which began this morning  He denies shortness of breath or any other significant URI symptoms  He denies known exposure to COVID-19 a traveled to [place] and got back 2 weeks ago  Denies asthma  Does not smoke  Review of Systems   Review of Systems   Constitutional: Positive for fever  HENT: Negative  Respiratory: Positive for cough  Negative for shortness of breath  Cardiovascular: Negative  Gastrointestinal: Negative  Genitourinary: Negative  Current Medications       Current Outpatient Medications:     aspirin (ECOTRIN LOW STRENGTH) 81 mg EC tablet, Take 81 mg by mouth daily, Disp: , Rfl:     atorvastatin (LIPITOR) 80 mg tablet, , Disp: , Rfl:     levothyroxine 75 mcg tablet, , Disp: , Rfl:     Na Sulfate-K Sulfate-Mg Sulf 17 5-3 13-1 6 GM/177ML SOLN, Take as directed by the office  , Disp: 2 Bottle, Rfl: 0    Na Sulfate-K Sulfate-Mg Sulf 17 5-3 13-1 6 GM/177ML SOLN, Take as directed by the office  , Disp: 2 Bottle, Rfl: 0    sertraline (ZOLOFT) 100 mg tablet, , Disp: , Rfl:     Current Allergies     Allergies as of 09/18/2020    (No Known Allergies)            The following portions of the patient's history were reviewed and updated as appropriate: allergies, current medications, past family history, past medical history, past social history, past surgical history and problem list      Past Medical History:   Diagnosis Date    Anxiety     CAD (coronary artery disease)     Colon polyp     Hyperlipidemia        Past Surgical History:   Procedure Laterality Date    COLONOSCOPY      CORONARY ARTERY BYPASS GRAFT      2011       Family History   Problem Relation Age of Onset    Diabetes Mother     Lung cancer Mother     No Known Problems Father          Medications have been verified  Objective   /79   Pulse 99   Temp (!) 101 °F (38 3 °C)   Resp 18   Ht 5' 9" (1 753 m)   Wt 86 2 kg (190 lb)   SpO2 95%   BMI 28 06 kg/m²        Physical Exam     Physical Exam  Vitals signs reviewed  Constitutional:       General: He is not in acute distress  Appearance: He is well-developed  Cardiovascular:      Rate and Rhythm: Normal rate and regular rhythm  Heart sounds: Normal heart sounds  No murmur  Pulmonary:      Effort: Pulmonary effort is normal  No respiratory distress  Breath sounds: Normal breath sounds  Neurological:      Mental Status: He is alert and oriented to person, place, and time

## 2021-03-22 ENCOUNTER — HOSPITAL ENCOUNTER (OUTPATIENT)
Facility: HOSPITAL | Age: 61
Setting detail: OBSERVATION
Discharge: HOME | End: 2021-03-23
Attending: EMERGENCY MEDICINE | Admitting: INTERNAL MEDICINE
Payer: COMMERCIAL

## 2021-03-22 ENCOUNTER — OFFICE VISIT (OUTPATIENT)
Dept: URGENT CARE | Facility: CLINIC | Age: 61
End: 2021-03-22
Payer: COMMERCIAL

## 2021-03-22 ENCOUNTER — APPOINTMENT (EMERGENCY)
Dept: RADIOLOGY | Facility: HOSPITAL | Age: 61
End: 2021-03-22
Payer: COMMERCIAL

## 2021-03-22 VITALS
BODY MASS INDEX: 28.88 KG/M2 | SYSTOLIC BLOOD PRESSURE: 136 MMHG | RESPIRATION RATE: 18 BRPM | DIASTOLIC BLOOD PRESSURE: 80 MMHG | HEIGHT: 69 IN | TEMPERATURE: 97.8 F | WEIGHT: 195 LBS | OXYGEN SATURATION: 97 % | HEART RATE: 69 BPM

## 2021-03-22 DIAGNOSIS — R07.9 CHEST PAIN: Primary | ICD-10-CM

## 2021-03-22 DIAGNOSIS — I20.0 UNSTABLE ANGINA PECTORIS (HCC): Primary | ICD-10-CM

## 2021-03-22 LAB
ALBUMIN SERPL BCP-MCNC: 4.1 G/DL (ref 3.5–5)
ALP SERPL-CCNC: 58 U/L (ref 46–116)
ALT SERPL W P-5'-P-CCNC: 54 U/L (ref 12–78)
ANION GAP SERPL CALCULATED.3IONS-SCNC: 8 MMOL/L (ref 4–13)
AST SERPL W P-5'-P-CCNC: 31 U/L (ref 5–45)
ATRIAL RATE: 57 BPM
ATRIAL RATE: 62 BPM
ATRIAL RATE: 66 BPM
BASOPHILS # BLD AUTO: 0.09 THOUSANDS/ΜL (ref 0–0.1)
BASOPHILS NFR BLD AUTO: 1 % (ref 0–1)
BILIRUB SERPL-MCNC: 0.5 MG/DL (ref 0.2–1)
BUN SERPL-MCNC: 18 MG/DL (ref 5–25)
CALCIUM SERPL-MCNC: 8.8 MG/DL (ref 8.3–10.1)
CHLORIDE SERPL-SCNC: 102 MMOL/L (ref 100–108)
CO2 SERPL-SCNC: 27 MMOL/L (ref 21–32)
CREAT SERPL-MCNC: 1.05 MG/DL (ref 0.6–1.3)
EOSINOPHIL # BLD AUTO: 0.28 THOUSAND/ΜL (ref 0–0.61)
EOSINOPHIL NFR BLD AUTO: 4 % (ref 0–6)
ERYTHROCYTE [DISTWIDTH] IN BLOOD BY AUTOMATED COUNT: 11.5 % (ref 11.6–15.1)
GFR SERPL CREATININE-BSD FRML MDRD: 77 ML/MIN/1.73SQ M
GLUCOSE SERPL-MCNC: 106 MG/DL (ref 65–140)
HCT VFR BLD AUTO: 45.2 % (ref 36.5–49.3)
HGB BLD-MCNC: 15.6 G/DL (ref 12–17)
IMM GRANULOCYTES # BLD AUTO: 0.02 THOUSAND/UL (ref 0–0.2)
IMM GRANULOCYTES NFR BLD AUTO: 0 % (ref 0–2)
LYMPHOCYTES # BLD AUTO: 2.08 THOUSANDS/ΜL (ref 0.6–4.47)
LYMPHOCYTES NFR BLD AUTO: 31 % (ref 14–44)
MAGNESIUM SERPL-MCNC: 2 MG/DL (ref 1.6–2.6)
MCH RBC QN AUTO: 31.7 PG (ref 26.8–34.3)
MCHC RBC AUTO-ENTMCNC: 34.5 G/DL (ref 31.4–37.4)
MCV RBC AUTO: 92 FL (ref 82–98)
MONOCYTES # BLD AUTO: 0.44 THOUSAND/ΜL (ref 0.17–1.22)
MONOCYTES NFR BLD AUTO: 7 % (ref 4–12)
NEUTROPHILS # BLD AUTO: 3.72 THOUSANDS/ΜL (ref 1.85–7.62)
NEUTS SEG NFR BLD AUTO: 57 % (ref 43–75)
NRBC BLD AUTO-RTO: 0 /100 WBCS
P AXIS: -13 DEGREES
P AXIS: -19 DEGREES
P AXIS: 7 DEGREES
PLATELET # BLD AUTO: 170 THOUSANDS/UL (ref 149–390)
PMV BLD AUTO: 10.1 FL (ref 8.9–12.7)
POTASSIUM SERPL-SCNC: 4.2 MMOL/L (ref 3.5–5.3)
PR INTERVAL: 140 MS
PR INTERVAL: 162 MS
PR INTERVAL: 162 MS
PROT SERPL-MCNC: 8 G/DL (ref 6.4–8.2)
QRS AXIS: 35 DEGREES
QRS AXIS: 48 DEGREES
QRS AXIS: 52 DEGREES
QRSD INTERVAL: 90 MS
QRSD INTERVAL: 94 MS
QRSD INTERVAL: 94 MS
QT INTERVAL: 382 MS
QT INTERVAL: 390 MS
QT INTERVAL: 390 MS
QTC INTERVAL: 379 MS
QTC INTERVAL: 387 MS
QTC INTERVAL: 408 MS
RBC # BLD AUTO: 4.92 MILLION/UL (ref 3.88–5.62)
SODIUM SERPL-SCNC: 137 MMOL/L (ref 136–145)
T WAVE AXIS: 52 DEGREES
T WAVE AXIS: 55 DEGREES
T WAVE AXIS: 77 DEGREES
TROPONIN I SERPL-MCNC: <0.02 NG/ML
VENTRICULAR RATE: 57 BPM
VENTRICULAR RATE: 62 BPM
VENTRICULAR RATE: 66 BPM
WBC # BLD AUTO: 6.63 THOUSAND/UL (ref 4.31–10.16)

## 2021-03-22 PROCEDURE — 84484 ASSAY OF TROPONIN QUANT: CPT | Performed by: EMERGENCY MEDICINE

## 2021-03-22 PROCEDURE — 85025 COMPLETE CBC W/AUTO DIFF WBC: CPT | Performed by: EMERGENCY MEDICINE

## 2021-03-22 PROCEDURE — 36415 COLL VENOUS BLD VENIPUNCTURE: CPT | Performed by: EMERGENCY MEDICINE

## 2021-03-22 PROCEDURE — 93010 ELECTROCARDIOGRAM REPORT: CPT | Performed by: INTERNAL MEDICINE

## 2021-03-22 PROCEDURE — 99285 EMERGENCY DEPT VISIT HI MDM: CPT

## 2021-03-22 PROCEDURE — 71045 X-RAY EXAM CHEST 1 VIEW: CPT

## 2021-03-22 PROCEDURE — 86704 HEP B CORE ANTIBODY TOTAL: CPT | Performed by: INTERNAL MEDICINE

## 2021-03-22 PROCEDURE — 83735 ASSAY OF MAGNESIUM: CPT | Performed by: EMERGENCY MEDICINE

## 2021-03-22 PROCEDURE — 99285 EMERGENCY DEPT VISIT HI MDM: CPT | Performed by: EMERGENCY MEDICINE

## 2021-03-22 PROCEDURE — 87340 HEPATITIS B SURFACE AG IA: CPT | Performed by: INTERNAL MEDICINE

## 2021-03-22 PROCEDURE — 84484 ASSAY OF TROPONIN QUANT: CPT | Performed by: INTERNAL MEDICINE

## 2021-03-22 PROCEDURE — 93005 ELECTROCARDIOGRAM TRACING: CPT

## 2021-03-22 PROCEDURE — 99213 OFFICE O/P EST LOW 20 MIN: CPT | Performed by: PHYSICIAN ASSISTANT

## 2021-03-22 PROCEDURE — 3008F BODY MASS INDEX DOCD: CPT | Performed by: PHYSICIAN ASSISTANT

## 2021-03-22 PROCEDURE — 80053 COMPREHEN METABOLIC PANEL: CPT | Performed by: EMERGENCY MEDICINE

## 2021-03-22 PROCEDURE — 86705 HEP B CORE ANTIBODY IGM: CPT | Performed by: INTERNAL MEDICINE

## 2021-03-22 PROCEDURE — 94762 N-INVAS EAR/PLS OXIMTRY CONT: CPT

## 2021-03-22 PROCEDURE — 99245 OFF/OP CONSLTJ NEW/EST HI 55: CPT | Performed by: INTERNAL MEDICINE

## 2021-03-22 PROCEDURE — 99220 PR INITIAL OBSERVATION CARE/DAY 70 MINUTES: CPT | Performed by: INTERNAL MEDICINE

## 2021-03-22 PROCEDURE — 86803 HEPATITIS C AB TEST: CPT | Performed by: INTERNAL MEDICINE

## 2021-03-22 RX ORDER — ATORVASTATIN CALCIUM 80 MG/1
80 TABLET, FILM COATED ORAL
Status: DISCONTINUED | OUTPATIENT
Start: 2021-03-22 | End: 2021-03-23 | Stop reason: HOSPADM

## 2021-03-22 RX ORDER — ASPIRIN 81 MG/1
81 TABLET ORAL DAILY
Status: DISCONTINUED | OUTPATIENT
Start: 2021-03-22 | End: 2021-03-23 | Stop reason: HOSPADM

## 2021-03-22 RX ORDER — ONDANSETRON 2 MG/ML
4 INJECTION INTRAMUSCULAR; INTRAVENOUS EVERY 6 HOURS PRN
Status: DISCONTINUED | OUTPATIENT
Start: 2021-03-22 | End: 2021-03-23 | Stop reason: HOSPADM

## 2021-03-22 RX ORDER — LEVOTHYROXINE SODIUM 0.07 MG/1
75 TABLET ORAL
Status: DISCONTINUED | OUTPATIENT
Start: 2021-03-22 | End: 2021-03-23 | Stop reason: HOSPADM

## 2021-03-22 RX ORDER — ACETAMINOPHEN 325 MG/1
650 TABLET ORAL EVERY 6 HOURS PRN
Status: DISCONTINUED | OUTPATIENT
Start: 2021-03-22 | End: 2021-03-23 | Stop reason: HOSPADM

## 2021-03-22 RX ADMIN — ENOXAPARIN SODIUM 40 MG: 40 INJECTION SUBCUTANEOUS at 14:12

## 2021-03-22 NOTE — H&P
[facility] 46  H&P- Noman Guillory 1960, 62 y o  male MRN: [id number]  Unit/Bed#: [address] [place] [address] Encounter: [id number]  Primary Care Provider: Uriel Foley MD   Date and time admitted to hospital: 3/22/2021  9:46 AM    * Chest pain  Assessment & Plan  Patient presented with intermittent chest pressure  Initial EKG was unremarkable and troponin was negative  Patient will be scheduled for exercise nuclear stress test in a m  Along with 2D echo  Cardiology consult appreciated  Patient's MAIK score is 2    CAD (coronary artery disease) of artery bypass graft  Assessment & Plan  History of five-vessel bypass about 10 years ago  Number follow up with Cardiology  Continue aspirin, statin  Patient is scheduled for nuclear stress test and echo in a m  Hypothyroidism  Assessment & Plan  Continue levothyroxine 75 microgram p o  Daily    Pure hypercholesterolemia  Assessment & Plan  Continue Lipitor 80 milligram p o  Daily  Check fasting lipid panel in am      VTE Prophylaxis: Enoxaparin (Lovenox)  / sequential compression device   Code Status:  Full code      Anticipated Length of Stay:  Patient will be admitted on an Observation basis with an anticipated length of stay of less than 2 midnights  Justification for Hospital Stay:  Chest pain    Total Time for Visit, including Counseling / Coordination of Care: 1 hour  Greater than 50% of this total time spent on direct patient counseling and coordination of care  Chief Complaint:   Chest pain    History of Present Illness: Noman Guillory is a 62 y o  male with past medical of CAD status post CABG, hypothyroidism, hyperlipidemia who presents with intermittent chest tightness  Patient reports that he has a very physically active person  Patient id a lot of physical activity yesterday during which time patient had some chest tightness and also felt dizziness when he was bending over the point of passing out    Patient has been having intermittent chest tightness for the past 3-4 days  Patient has a shortness of breath, palpitations acute chest pain  Patient had bypass surgery almost 10 years ago but did not follow up with the cardiologist after that  In the ED patient's vital signs were stable on troponins were negative  EKG was unremarkable  Review of Systems:    Review of Systems   Constitutional: Negative for chills, diaphoresis, fatigue and unexpected weight change  HENT: Negative for congestion, ear discharge, ear pain, facial swelling, hearing loss, mouth sores, nosebleeds, postnasal drip, rhinorrhea, sinus pressure, sneezing, sore throat, tinnitus, trouble swallowing and voice change  Eyes: Negative for photophobia, discharge, redness and visual disturbance  Respiratory: Positive for chest tightness  Negative for cough, shortness of breath, wheezing and stridor  Cardiovascular: Negative for chest pain, palpitations and leg swelling  Gastrointestinal: Negative for abdominal distention, abdominal pain, anal bleeding, blood in stool, constipation, diarrhea, nausea and vomiting  Endocrine: Negative for polydipsia, polyphagia and polyuria  Genitourinary: Negative for decreased urine volume, difficulty urinating, dysuria, flank pain, frequency, hematuria and urgency  Musculoskeletal: Negative for arthralgias, back pain and neck stiffness  Skin: Negative for pallor and rash  Neurological: Positive for dizziness  Negative for seizures, facial asymmetry, speech difficulty, light-headedness, numbness and headaches  Hematological: Negative for adenopathy  Does not bruise/bleed easily  Psychiatric/Behavioral: Negative for agitation and confusion         Past Medical and Surgical History:     Past Medical History:   Diagnosis Date    Anxiety     CAD (coronary artery disease)     Colon polyp     Hyperlipidemia        Past Surgical History:   Procedure Laterality Date    COLONOSCOPY      CORONARY ARTERY BYPASS GRAFT 2011       Meds/Allergies:    Prior to Admission medications    Medication Sig Start Date End Date Taking? Authorizing Provider   aspirin (ECOTRIN LOW STRENGTH) 81 mg EC tablet Take 81 mg by mouth daily   Yes Historical Provider, MD   atorvastatin (LIPITOR) 80 mg tablet  6/30/19  Yes Historical Provider, MD   levothyroxine 75 mcg tablet  6/30/19  Yes Historical Provider, MD   sertraline (ZOLOFT) 100 mg tablet  6/29/19  Yes Historical Provider, MD   Na Sulfate-K Sulfate-Mg Sulf 17 5-3 13-1 6 GM/177ML SOLN Take as directed by the office  Patient not taking: Reported on 3/22/2021 10/11/19 3/22/21  Yasmeen Pelaez PA-C   Na Sulfate-K Sulfate-Mg Sulf 17 5-3 13-1 6 GM/177ML SOLN Take as directed by the office  Patient not taking: Reported on 3/22/2021 1/16/20 3/22/21  Loren Amor PA-C     I have reviewed home medications with patient personally  Allergies: No Known Allergies    Social History:     Marital Status: Unknown     Substance Use History:   Social History     Substance and Sexual Activity   Alcohol Use Never    Frequency: Never     Social History     Tobacco Use   Smoking Status Former Smoker    Packs/day: 1 00    Years: 12 00    Pack years: 12 00   Smokeless Tobacco Never Used     Social History     Substance and Sexual Activity   Drug Use Never       Family History:    Family History   Problem Relation Age of Onset    Diabetes Mother     Lung cancer Mother     No Known Problems Father        Physical Exam:     Vitals:   Blood Pressure: 137/78 (03/22/21 1920)  Pulse: 77 (03/22/21 1920)  Temperature: 97 5 °F (36 4 °C) (03/22/21 1920)  Temp Source: Oral (03/22/21 1246)  Respirations: 18 (03/22/21 1920)  Height: 5' 9" (175 3 cm) (03/22/21 1246)  Weight - Scale: 87 7 kg (193 lb 6 4 oz) (03/22/21 1246)  SpO2: 96 % (03/22/21 1920)    Physical Exam  Constitutional:       Appearance: Normal appearance  HENT:      Head: Normocephalic and atraumatic     Eyes:      Extraocular Movements: Extraocular movements intact  Pupils: Pupils are equal, round, and reactive to light  Neck:      Musculoskeletal: Normal range of motion and neck supple  Cardiovascular:      Rate and Rhythm: Normal rate and regular rhythm  Heart sounds: No murmur  No gallop  Pulmonary:      Effort: Pulmonary effort is normal       Breath sounds: Normal breath sounds  Abdominal:      General: Bowel sounds are normal       Palpations: Abdomen is soft  Tenderness: There is no abdominal tenderness  Musculoskeletal: Normal range of motion  General: No swelling or deformity  Skin:     General: Skin is warm and dry  Neurological:      General: No focal deficit present  Mental Status: He is alert  Additional Data:     Lab Results: I have personally reviewed pertinent reports  Results from last 7 days   Lab Units 03/22/21  1002   WBC Thousand/uL 6 63   HEMOGLOBIN g/dL 15 6   HEMATOCRIT % 45 2   PLATELETS Thousands/uL 170   NEUTROS PCT % 57   LYMPHS PCT % 31   MONOS PCT % 7   EOS PCT % 4     Results from last 7 days   Lab Units 03/22/21  1002   SODIUM mmol/L 137   POTASSIUM mmol/L 4 2   CHLORIDE mmol/L 102   CO2 mmol/L 27   BUN mg/dL 18   CREATININE mg/dL 1 05   ANION GAP mmol/L 8   CALCIUM mg/dL 8 8   ALBUMIN g/dL 4 1   TOTAL BILIRUBIN mg/dL 0 50   ALK PHOS U/L 58   ALT U/L 54   AST U/L 31   GLUCOSE RANDOM mg/dL 106                       Imaging: I have personally reviewed pertinent films in PACS    XR chest 1 view portable   Final Result by Jassi Carrera MD (03/22 1120)      No acute cardiopulmonary disease  Workstation performed: [id number]             EKG, Pathology, and Other Studies Reviewed on Admission:   · EKG:  EKG shows normal sinus rhythm without any acute ST-T changes    [facility] Records Reviewed: Yes     ** Please Note: This note has been constructed using a voice recognition system   **

## 2021-03-22 NOTE — PLAN OF CARE
Problem: Potential for Falls  Goal: Patient will remain free of falls  Description: INTERVENTIONS:  - Assess patient frequently for physical needs  -  Identify cognitive and physical deficits and behaviors that affect risk of falls    -  [place] fall precautions as indicated by assessment   - Educate patient/family on patient safety including physical limitations  - Instruct patient to call for assistance with activity based on assessment  - Modify environment to reduce risk of injury  - Consider OT/PT consult to assist with strengthening/mobility  Outcome: Progressing     Problem: CARDIOVASCULAR - ADULT  Goal: Maintains optimal cardiac output and hemodynamic stability  Description: INTERVENTIONS:  - Monitor I/O, vital signs and rhythm  - Monitor for S/S and trends of decreased cardiac output  - Administer and titrate ordered vasoactive medications to optimize hemodynamic stability  - Assess quality of pulses, skin color and temperature  - Assess for signs of decreased coronary artery perfusion  - Instruct patient to report change in severity of symptoms  Outcome: Progressing  Goal: Absence of cardiac dysrhythmias or at baseline rhythm  Description: INTERVENTIONS:  - Continuous cardiac monitoring, vital signs, obtain 12 lead EKG if ordered  - Administer antiarrhythmic and heart rate control medications as ordered  - Monitor electrolytes and administer replacement therapy as ordered  Outcome: Progressing     Problem: PAIN - ADULT  Goal: Verbalizes/displays adequate comfort level or baseline comfort level  Description: Interventions:  - Encourage patient to monitor pain and request assistance  - Assess pain using appropriate pain scale  - Administer analgesics based on type and severity of pain and evaluate response  - Implement non-pharmacological measures as appropriate and evaluate response  - Consider cultural and social influences on pain and pain management  - Notify physician/advanced practitioner if interventions unsuccessful or patient reports new pain  Outcome: Progressing     Problem: INFECTION - ADULT  Goal: Absence or prevention of progression during hospitalization  Description: INTERVENTIONS:  - Assess and monitor for signs and symptoms of infection  - Monitor lab/diagnostic results  - Monitor all insertion sites, i e  indwelling lines, tubes, and drains  - Monitor endotracheal if appropriate and nasal secretions for changes in amount and color  - [place] appropriate cooling/warming therapies per order  - Administer medications as ordered  - Instruct and encourage patient and family to use good hand hygiene technique  - Identify and instruct in appropriate isolation precautions for identified infection/condition  Outcome: Progressing     Problem: SAFETY ADULT  Goal: Maintain or return to baseline ADL function  Description: INTERVENTIONS:  -  Assess patient's ability to carry out ADLs; assess patient's baseline for ADL function and identify physical deficits which impact ability to perform ADLs (bathing, care of mouth/teeth, toileting, grooming, dressing, etc )  - Assess/evaluate cause of self-care deficits   - Assess range of motion  - Assess patient's mobility; develop plan if impaired  - Assess patient's need for assistive devices and provide as appropriate  - Encourage maximum independence but intervene and supervise when necessary  - Involve family in performance of ADLs  - Assess for home care needs following discharge   - Consider OT consult to assist with ADL evaluation and planning for discharge  - Provide patient education as appropriate  Outcome: Progressing  Goal: Maintain or return mobility status to optimal level  Description: INTERVENTIONS:  - Assess patient's baseline mobility status (ambulation, transfers, stairs, etc )    - Identify cognitive and physical deficits and behaviors that affect mobility  - Identify mobility aids required to assist with transfers and/or ambulation (gait belt, sit-to-stand, lift, walker, cane, etc )  - [place] fall precautions as indicated by assessment  - Record patient progress and toleration of activity level on Mobility SBAR; progress patient to next Phase/Stage  - Instruct patient to call for assistance with activity based on assessment  - Consider rehabilitation consult to assist with strengthening/weightbearing, etc   Outcome: Progressing     Problem: DISCHARGE PLANNING  Goal: Discharge to home or other facility with appropriate resources  Description: INTERVENTIONS:  - Identify barriers to discharge w/patient and caregiver  - Arrange for needed discharge resources and transportation as appropriate  - Identify discharge learning needs (meds, wound care, etc )  - Arrange for interpretive services to assist at discharge as needed  - Refer to Case Management Department for coordinating discharge planning if the patient needs post-hospital services based on physician/advanced practitioner order or complex needs related to functional status, cognitive ability, or social support system  Outcome: Progressing     Problem: Knowledge Deficit  Goal: Patient/family/caregiver demonstrates understanding of disease process, treatment plan, medications, and discharge instructions  Description: Complete learning assessment and assess knowledge base    Interventions:  - Provide teaching at level of understanding  - Provide teaching via preferred learning methods  Outcome: Progressing

## 2021-03-22 NOTE — PROGRESS NOTES
[address] [facility]        NAME: Rafa Kaur is a 62 y o  male  : 1960    MRN: [id number]  DATE: 2021  TIME: 10:26 AM    Assessment and Plan   Unstable angina pectoris ([address] ) [I20 0]  1  Unstable angina pectoris [facility])           Patient Instructions     Patient Instructions   Discussed with patient that based on ECG and symptoms would like his wife to go be further evaluated in the ED  Patient will head over there right now  Sent with a copy of ECG  Follow up with PCP in 3-5 days  Proceed to  ER if symptoms worsen  Chief Complaint     Chief Complaint   Patient presents with    Chest Pain     chest pain midline  radiating to left side          History of Present Illness       79-year-old male with a history cardiac bypass surgery 10 years ago,  Hypothyroidism and hyperlipidemia presents today with chest pressure and discomfort x2 weeks  He notes the pain is most intense in the morning  It is not increased with exertion or while lying flat  He does note occasional shortness of breath but again not exertional or positional   Patient states yesterday while working in the yard like he was going to pass out  The feeling was described as dark patches over the eyes and feeling unsteady  Patient was scheduled for a stress test almost a year ago but it was canceled due to the pandemic was never rescheduled  He has not seen a cardiologist in numerous years  Patient takes a aspirin daily  Patient denies palpitations  Review of Systems   Review of Systems   Constitutional: Positive for fatigue  Negative for fever  Respiratory: Positive for shortness of breath  Negative for cough  Cardiovascular: Positive for chest pain  Negative for palpitations and leg swelling  Gastrointestinal: Negative for nausea  Musculoskeletal: Negative for back pain  Neurological: Positive for light-headedness  Negative for headaches           Current Medications       Current Outpatient Medications:     aspirin (ECOTRIN LOW STRENGTH) 81 mg EC tablet, Take 81 mg by mouth daily, Disp: , Rfl:     atorvastatin (LIPITOR) 80 mg tablet, , Disp: , Rfl:     levothyroxine 75 mcg tablet, , Disp: , Rfl:     sertraline (ZOLOFT) 100 mg tablet, , Disp: , Rfl:     Current Allergies     Allergies as of 03/22/2021    (No Known Allergies)            The following portions of the patient's history were reviewed and updated as appropriate: allergies, current medications, past family history, past medical history, past social history, past surgical history and problem list      Past Medical History:   Diagnosis Date    Anxiety     CAD (coronary artery disease)     Colon polyp     Hyperlipidemia        Past Surgical History:   Procedure Laterality Date    COLONOSCOPY      CORONARY ARTERY BYPASS GRAFT      2011       Family History   Problem Relation Age of Onset    Diabetes Mother     Lung cancer Mother     No Known Problems Father          Medications have been verified  Objective   /80   Pulse 69   Temp 97 8 °F (36 6 °C)   Resp 18   Ht 5' 9" (1 753 m)   Wt 88 5 kg (195 lb)   SpO2 97%   BMI 28 80 kg/m²        Physical Exam     Physical Exam  Vitals signs and nursing note reviewed  Constitutional:       Appearance: He is well-developed  Eyes:      Extraocular Movements: Extraocular movements intact  Cardiovascular:      Rate and Rhythm: Normal rate and regular rhythm  Heart sounds: Normal heart sounds  Heart sounds not distant  Pulmonary:      Effort: Pulmonary effort is normal  No tachypnea  Breath sounds: Normal breath sounds  Musculoskeletal:      Right lower leg: No edema  Left lower leg: No edema  Skin:     Coloration: Skin is not cyanotic  Neurological:      Mental Status: He is alert

## 2021-03-22 NOTE — ED PROVIDER NOTES
History  Chief Complaint   Patient presents with    Chest Pain     Patient was at [facility] and had an abnormal EKG,was sent here,having chest pressure mid chest and to the left chest,states he has been having discomfort for a long time and thinks because he does alot of physical work but states today feels different     63 y/o male presents with substernal chest pressure that started a month ago, intermittent  Not worse with exertion  Patient has history of CAD with a CABG many years ago  Last cardiac evaluation was 3 years ago says his stress test at that time was normal   He just moved to the area is due to get in with a cardiologist   Denies any pleurisy no shortness of breath leg pain calf pain no long travels recent surgeries no history of cancer not oral chemotherapy  History provided by:  Patient   used: No        Prior to Admission Medications   Prescriptions Last Dose Informant Patient Reported? Taking?   aspirin (ECOTRIN LOW STRENGTH) 81 mg EC tablet 3/22/2021 at 0500 Self Yes Yes   Sig: Take 81 mg by mouth daily   atorvastatin (LIPITOR) 80 mg tablet 3/22/2021 at 0500  Yes Yes   levothyroxine 75 mcg tablet 3/22/2021 at 0500  Yes Yes   sertraline (ZOLOFT) 100 mg tablet 3/22/2021 at 0500  Yes Yes      Facility-Administered Medications: None       Past Medical History:   Diagnosis Date    Anxiety     CAD (coronary artery disease)     Colon polyp     Hyperlipidemia        Past Surgical History:   Procedure Laterality Date    COLONOSCOPY      CORONARY ARTERY BYPASS GRAFT      2011       Family History   Problem Relation Age of Onset    Diabetes Mother     Lung cancer Mother     No Known Problems Father      I have reviewed and agree with the history as documented      E-Cigarette/Vaping     E-Cigarette/Vaping Substances     Social History     Tobacco Use    Smoking status: Former Smoker     Packs/day: 1 00     Years: 12 00     Pack years: 12 00    Smokeless tobacco: Never Used   Substance Use Topics    Alcohol use: Never     Frequency: Never    Drug use: Never       Review of Systems   Constitutional: Negative  HENT: Negative  Eyes: Negative  Respiratory: Negative  Cardiovascular: Positive for chest pain  Gastrointestinal: Negative  Endocrine: Negative  Genitourinary: Negative  Musculoskeletal: Negative  Skin: Negative  Allergic/Immunologic: Negative  Neurological: Negative  Hematological: Negative  Psychiatric/Behavioral: Negative  All other systems reviewed and are negative  Physical Exam  Physical Exam  Vitals signs and nursing note reviewed  Constitutional:       Appearance: He is well-developed  HENT:      Head: Normocephalic and atraumatic  Eyes:      Pupils: Pupils are equal, round, and reactive to light  Neck:      Musculoskeletal: Normal range of motion and neck supple  Cardiovascular:      Rate and Rhythm: Normal rate and regular rhythm  Pulmonary:      Effort: Pulmonary effort is normal       Breath sounds: Normal breath sounds  Abdominal:      General: Bowel sounds are normal       Palpations: Abdomen is soft  Musculoskeletal: Normal range of motion  Skin:     General: Skin is warm and dry  Neurological:      Mental Status: He is alert and oriented to person, place, and time           Vital Signs  ED Triage Vitals   Temperature Pulse Respirations Blood Pressure SpO2   03/22/21 0951 03/22/21 0951 03/22/21 0951 03/22/21 0951 03/22/21 0951   97 9 °F (36 6 °C) 60 18 169/81 97 %      Temp Source Heart Rate Source Patient Position - Orthostatic VS BP Location FiO2 (%)   03/22/21 0951 03/22/21 0951 03/22/21 0951 03/22/21 0951 --   Tympanic Monitor Lying Left arm       Pain Score       03/22/21 1335       No Pain           Vitals:    03/22/21 2304 03/23/21 0304 03/23/21 0854 03/23/21 1504   BP: 139/77 135/77 136/77 134/77   Pulse: 75 77 76 69   Patient Position - Orthostatic VS:             Visual Acuity      ED Medications  Medications   regadenoson (LEXISCAN) injection 0 4 mg (0 4 mg Intravenous Given 3/23/21 1132)       Diagnostic Studies  Results Reviewed     Procedure Component Value Units Date/Time    Troponin I [[id number]]  (Normal) Collected: 03/22/21 1002    Lab Status: Final result Specimen: Blood from Arm, Right Updated: 03/22/21 1030     Troponin I <0 02 ng/mL     Comprehensive metabolic panel [[id number]] Collected: 03/22/21 1002    Lab Status: Final result Specimen: Blood from Arm, Right Updated: 03/22/21 1025     Sodium 137 mmol/L      Potassium 4 2 mmol/L      Chloride 102 mmol/L      CO2 27 mmol/L      ANION GAP 8 mmol/L      BUN 18 mg/dL      Creatinine 1 05 mg/dL      Glucose 106 mg/dL      Calcium 8 8 mg/dL      AST 31 U/L      ALT 54 U/L      Alkaline Phosphatase 58 U/L      Total Protein 8 0 g/dL      Albumin 4 1 g/dL      Total Bilirubin 0 50 mg/dL      eGFR 77 ml/min/1 73sq m     Narrative:      [place] guidelines for Chronic Kidney Disease (CKD):     Stage 1 with normal or high GFR (GFR > 90 mL/min/1 73 square meters)    Stage 2 Mild CKD (GFR = 60-89 mL/min/1 73 square meters)    Stage 3A Moderate CKD (GFR = 45-59 mL/min/1 73 square meters)    Stage 3B Moderate CKD (GFR = 30-44 mL/min/1 73 square meters)    Stage 4 Severe CKD (GFR = 15-29 mL/min/1 73 square meters)    Stage 5 End Stage CKD (GFR <15 mL/min/1 73 square meters)  Note: GFR calculation is accurate only with a steady state creatinine    Magnesium [[id number]]  (Normal) Collected: 03/22/21 1002    Lab Status: Final result Specimen: Blood from Arm, Right Updated: 03/22/21 1025     Magnesium 2 0 mg/dL     CBC and differential [[id number]]  (Abnormal) Collected: 03/22/21 1002    Lab Status: Final result Specimen: Blood from Arm, Right Updated: 03/22/21 1009     WBC 6 63 Thousand/uL      RBC 4 92 Million/uL      Hemoglobin 15 6 g/dL      Hematocrit 45 2 %      MCV 92 fL      MCH 31 7 pg      MCHC 34 5 g/dL RDW 11 5 %      MPV 10 1 fL      Platelets 500 Thousands/uL      nRBC 0 /100 WBCs      Neutrophils Relative 57 %      Immat GRANS % 0 %      Lymphocytes Relative 31 %      Monocytes Relative 7 %      Eosinophils Relative 4 %      Basophils Relative 1 %      Neutrophils Absolute 3 72 Thousands/µL      Immature Grans Absolute 0 02 Thousand/uL      Lymphocytes Absolute 2 08 Thousands/µL      Monocytes Absolute 0 44 Thousand/µL      Eosinophils Absolute 0 28 Thousand/µL      Basophils Absolute 0 09 Thousands/µL                  XR chest 1 view portable   Final Result by Cornel Blair MD (03/22 1120)      No acute cardiopulmonary disease  Workstation performed: [id number]                    Procedures  ECG 12 Lead Documentation Only  Performed by: Davi Herrmann DO  Authorized by: Davi Herrmann DO     ECG reviewed by me, the ED Provider: yes    Patient location:  ED  Previous ECG:     Previous ECG:  Unavailable    Comparison to cardiac monitor: Yes    Interpretation:     Interpretation: non-specific    Rate:     ECG rate assessment: normal    Rhythm:     Rhythm: sinus rhythm    Ectopy:     Ectopy: none    QRS:     QRS axis:  Normal  Conduction:     Conduction: normal    ST segments:     ST segments:  Non-specific  T waves:     T waves: non-specific               ED Course             HEART Risk Score      Most Recent Value   Heart Score Risk Calculator   History  1 Filed at: 03/21/2021 0950   ECG  1 Filed at: 03/21/2021 0950   Age  1 Filed at: 03/21/2021 0950   Risk Factors  2 Filed at: 03/21/2021 0950   Troponin  0 Filed at: 03/21/2021 0950   HEART Score  5 Filed at: 03/21/2021 1398                      SBIRT 20yo+      Most Recent Value   SBIRT (25 yo +)   In order to provide better care to our patients, we are screening all of our patients for alcohol and drug use  Would it be okay to ask you these screening questions? Yes Filed at: 03/22/2021 1005   Initial Alcohol Screen: US AUDIT-C    1   How often do you have a drink containing alcohol? 2 Filed at: 03/22/2021 1005   2  How many drinks containing alcohol do you have on a typical day you are drinking? 1 Filed at: 03/22/2021 1005   3a  Male UNDER 65: How often do you have five or more drinks on one occasion? 0 Filed at: 03/22/2021 1005   3b  FEMALE Any Age, or MALE 65+: How often do you have 4 or more drinks on one occassion? 0 Filed at: 03/22/2021 1005   Audit-C Score  3 Filed at: 03/22/2021 1005   LAVERN: How many times in the past year have you    Used an illegal drug or used a prescription medication for non-medical reasons?   Never Filed at: 03/22/2021 1005        MAIK Risk Score      Most Recent Value   Age >= 72  0 Filed at: 03/22/2021 5654   Known CAD (stenosis >= 50%)  1 Filed at: 03/22/2021 7568   Recent (<=24 hrs) Service Angina  0 Filed at: 03/22/2021 1923   ST Deviation >= 0 5 mm  0 Filed at: 03/22/2021 1923   3+ CAD Risk Factors (FHx, HTN, HLP, DM, Smoker)  0 Filed at: 03/22/2021 1923   Aspirin Use Past 7 Days  1 Filed at: 03/22/2021 1923   Elevated Cardiac Markers  0 Filed at: 03/22/2021 1923   MAIK Risk Score (Calculated)  2 Filed at: 03/22/2021 1490                  MDM  Number of Diagnoses or Management Options  Chest pain:      Amount and/or Complexity of Data Reviewed  Clinical lab tests: ordered and reviewed  Tests in the radiology section of CPT®: ordered and reviewed  Tests in the medicine section of CPT®: ordered and reviewed    Patient Progress  Patient progress: stable      Disposition  Final diagnoses:   Chest pain     Time reflects when diagnosis was documented in both MDM as applicable and the Disposition within this note     Time User Action Codes Description Comment    3/22/2021 11:23 AM Jenna Hamm Add [R07 9] Chest pain       ED Disposition     ED Disposition Condition Date/Time Comment    Admit Stable Mon Mar 22, 2021 11:23 AM         Follow-up Information     Follow up With Specialties Details Why Contact Info Nimo Cid MD Family Medicine Follow up Within 1 week [address] [facility] [place]  [address]  [phone]            Discharge Medication List as of 3/23/2021  3:49 PM      CONTINUE these medications which have NOT CHANGED    Details   aspirin (ECOTRIN LOW STRENGTH) 81 mg EC tablet Take 81 mg by mouth daily, Historical Med      atorvastatin (LIPITOR) 80 mg tablet Starting Sun 6/30/2019, Historical Med      levothyroxine 75 mcg tablet Starting Sun 6/30/2019, Historical Med      sertraline (ZOLOFT) 100 mg tablet Starting Sat 6/29/2019, Historical Med           Outpatient Discharge Orders   Discharge Diet     Activity as tolerated     Call provider for:  persistent nausea or vomiting     Call provider for:  severe uncontrolled pain     No dressing needed       PDMP Review     None          ED Provider  Electronically Signed by           Claude Burn, DO  03/24/21 2020

## 2021-03-22 NOTE — ASSESSMENT & PLAN NOTE
History of five-vessel bypass about 10 years ago  Number follow up with Cardiology  Continue aspirin, statin  Patient is scheduled for nuclear stress test and echo in a

## 2021-03-22 NOTE — CONSULTS
Consultation - Cardiology   Az Mendez 62 y o  male MRN: [id number]  Unit/Bed#: [address] 419[address]01 Encounter: [id number]    Assessment/Plan     Assessment:  1  Chest pressure in patient with history of coronary artery disease and CABG x5  2  Hypothyroidism  3  Dyslipidemia  4  Anxiety       Plan:  Patient has been placed in observation status  1  Patient will undergo 2D echocardiogram to evaluate cardiac function, structure wall motion  2  Will have exercise nuclear stress test to evaluate for ischemia  3  Will attempt to obtain medical records from his previous cardiologist   4  Continue his medications as he was taking at home  History of Present Illness   Physician Requesting Consult: Caprice Cruz MD  Reason for Consult / Principal Problem:  Chest pressure patient with history of quintuple bypass 10 years ago in [place]  Has not followed up with a cardiologist in approximately 3 years  HPI: Az Mendez is a 62y o  year old male who presented to the emergency room after experiencing chest pressure and lightheadedness while doing physical activities in his yard  Patient became alarmed because states he is normally extremely active in his jobs  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, climbing ladders lifting heavy objects 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  Yesterday he was working in his yard, and as he was working he had some chest tightness and also noted some lightheadedness when he was bending over working on [place]  He came to the emergency room today for evaluation  Two sets of troponins have been negative an EKG is unremarkable  Due to his history of quintuple bypass will have an echo and nuclear stress test performed tomorrow  He notes his surgery was approximately 10 years ago in [address] [place] [address]    The last time he has seen a cardiologist was when he lived in [place] any believes hours approximately 3 years ago  He states he had a stress test with his cardiologist at [facility]ology group  They are part of the [address] [facility]  Inpatient consult to Cardiology  Consult performed by: EUGENIA Quesada  Consult ordered by: Tri Benoit MD          Review of Systems   Constitutional: Positive for activity change and fever  Negative for chills  HENT: Negative  Negative for congestion, facial swelling, postnasal drip, sore throat and trouble swallowing  Eyes: Negative for photophobia and visual disturbance  Respiratory: Positive for chest tightness  Negative for shortness of breath  Cardiovascular: Positive for chest pain  Negative for palpitations and leg swelling  Gastrointestinal: Negative  Negative for abdominal distention, constipation, diarrhea, nausea and vomiting  Endocrine: Negative for polydipsia, polyphagia and polyuria  Genitourinary: Negative for difficulty urinating  Musculoskeletal: Negative  Neurological: Positive for dizziness and light-headedness  Negative for syncope and weakness  Hematological: Negative  Psychiatric/Behavioral: Negative          Historical Information   Past Medical History:   Diagnosis Date    Anxiety     CAD (coronary artery disease)     Colon polyp     Hyperlipidemia      Past Surgical History:   Procedure Laterality Date    COLONOSCOPY      CORONARY ARTERY BYPASS GRAFT      2011     Social History     Substance and Sexual Activity   Alcohol Use Never    Frequency: Never     Social History     Substance and Sexual Activity   Drug Use Never     E-Cigarette/Vaping     E-Cigarette/Vaping Substances     Social History     Tobacco Use   Smoking Status Former Smoker    Packs/day: 1 00    Years: 12 00    Pack years: 12 00   Smokeless Tobacco Never Used     Family History:   Family History   Problem Relation Age of Onset    Diabetes Mother     Lung cancer Mother     No Known Problems Father        Meds/Allergies   all current active meds have been reviewed, current meds:   Current Facility-Administered Medications   Medication Dose Route Frequency    acetaminophen (TYLENOL) tablet 650 mg  650 mg Oral Q6H PRN    aspirin (ECOTRIN LOW STRENGTH) EC tablet 81 mg  81 mg Oral Daily    atorvastatin (LIPITOR) tablet 80 mg  80 mg Oral Daily With Dinner    enoxaparin (LOVENOX) subcutaneous injection 40 mg  40 mg Subcutaneous Daily    levothyroxine tablet 75 mcg  75 mcg Oral Early Morning    ondansetron (ZOFRAN) injection 4 mg  4 mg Intravenous Q6H PRN    sertraline (ZOLOFT) tablet 50 mg  50 mg Oral HS    and PTA meds:   Prior to Admission Medications   Prescriptions Last Dose Informant Patient Reported? Taking?   aspirin (ECOTRIN LOW STRENGTH) 81 mg EC tablet 3/22/2021 at 0500 Self Yes Yes   Sig: Take 81 mg by mouth daily   atorvastatin (LIPITOR) 80 mg tablet 3/22/2021 at 0500  Yes Yes   levothyroxine 75 mcg tablet 3/22/2021 at 0500  Yes Yes   sertraline (ZOLOFT) 100 mg tablet 3/22/2021 at 0500  Yes Yes      Facility-Administered Medications: None     No Known Allergies    Objective   Vitals: Blood pressure 143/83, pulse 56, temperature 97 5 °F (36 4 °C), resp  rate 18, height 5' 9" (1 753 m), weight 87 7 kg (193 lb 6 4 oz), SpO2 97 %  Orthostatic Blood Pressures      Most Recent Value   Blood Pressure  143/83 filed at 03/22/2021 1458   Patient Position - Orthostatic VS  Lying filed at 03/22/2021 0951          No intake or output data in the 24 hours ending 03/22/21 1530    Invasive Devices     Peripheral Intravenous Line            Peripheral IV 03/22/21 Right Antecubital less than 1 day                Physical Exam  Vitals signs and nursing note reviewed  Constitutional:       General: He is not in acute distress  Appearance: Normal appearance  He is obese  HENT:      Head: Normocephalic        Right Ear: External ear normal       Left Ear: External ear normal       Nose: Nose normal    Eyes:      General: No scleral icterus  Right eye: No discharge  Left eye: No discharge  Conjunctiva/sclera: Conjunctivae normal       Pupils: Pupils are equal, round, and reactive to light  Neck:      Musculoskeletal: Normal range of motion and neck supple  Cardiovascular:      Rate and Rhythm: Normal rate and regular rhythm  Pulses: Normal pulses  Heart sounds: Normal heart sounds  No murmur  Pulmonary:      Effort: Pulmonary effort is normal  No respiratory distress  Breath sounds: Normal breath sounds  No wheezing, rhonchi or rales  Abdominal:      General: Bowel sounds are normal  There is no distension  Palpations: Abdomen is soft  Musculoskeletal:      Right lower leg: No edema  Left lower leg: No edema  Skin:     General: Skin is warm and dry  Capillary Refill: Capillary refill takes less than 2 seconds  Coloration: Skin is not jaundiced  Neurological:      General: No focal deficit present  Mental Status: He is alert and oriented to person, place, and time  Mental status is at baseline  Psychiatric:         Mood and Affect: Mood normal          Behavior: Behavior normal          Thought Content: Thought content normal          Judgment: Judgment normal          Lab Results:   I have personally reviewed pertinent lab results      CBC with diff:   Results from last 7 days   Lab Units 03/22/21  1002   WBC Thousand/uL 6 63   RBC Million/uL 4 92   HEMOGLOBIN g/dL 15 6   HEMATOCRIT % 45 2   MCV fL 92   MCH pg 31 7   MCHC g/dL 34 5   RDW % 11 5*   MPV fL 10 1   PLATELETS Thousands/uL 170     CMP:   Results from last 7 days   Lab Units 03/22/21  1002   SODIUM mmol/L 137   POTASSIUM mmol/L 4 2   CHLORIDE mmol/L 102   CO2 mmol/L 27   BUN mg/dL 18   CREATININE mg/dL 1 05   CALCIUM mg/dL 8 8   AST U/L 31   ALT U/L 54   ALK PHOS U/L 58   EGFR ml/min/1 73sq m 77     Troponin:   0   Lab Value Date/Time    TROPONINI <0 02 03/22/2021 1358    TROPONINI <0 02 03/22/2021 1002 BNP:   Results from last 7 days   Lab Units 03/22/21  1002   POTASSIUM mmol/L 4 2   CHLORIDE mmol/L 102   CO2 mmol/L 27   BUN mg/dL 18   CREATININE mg/dL 1 05   CALCIUM mg/dL 8 8   EGFR ml/min/1 73sq m 77     Magnesium:   Results from last 7 days   Lab Units 03/22/21  1002   MAGNESIUM mg/dL 2 0     Lipid Profile:     Imaging: I have personally reviewed pertinent reports      EKG: Sinus Rhythm  VTE Prophylaxis: Sequential compression device Lenora Rowell     Code Status: Level 1 - Full Code  Advance Directive and Living Will:      Power of :    POLST:      Jose Mcdowell, [facility]  Cardiology

## 2021-03-22 NOTE — PATIENT INSTRUCTIONS
Discussed with patient that based on ECG and symptoms would like his wife to go be further evaluated in the ED  Patient will head over there right now  Sent with a copy of ECG

## 2021-03-22 NOTE — ASSESSMENT & PLAN NOTE
Patient presented with intermittent chest pressure  Initial EKG was unremarkable and troponin was negative  Patient will be scheduled for exercise nuclear stress test in a m   Along with 2D echo  Cardiology consult appreciated  Patient's MAIK score is 2

## 2021-03-23 ENCOUNTER — APPOINTMENT (OUTPATIENT)
Dept: NON INVASIVE DIAGNOSTICS | Facility: HOSPITAL | Age: 61
End: 2021-03-23
Payer: COMMERCIAL

## 2021-03-23 ENCOUNTER — APPOINTMENT (OUTPATIENT)
Dept: RADIOLOGY | Facility: HOSPITAL | Age: 61
End: 2021-03-23
Payer: COMMERCIAL

## 2021-03-23 VITALS
SYSTOLIC BLOOD PRESSURE: 134 MMHG | HEIGHT: 69 IN | WEIGHT: 193.4 LBS | RESPIRATION RATE: 19 BRPM | DIASTOLIC BLOOD PRESSURE: 77 MMHG | TEMPERATURE: 97.3 F | BODY MASS INDEX: 28.64 KG/M2 | HEART RATE: 69 BPM | OXYGEN SATURATION: 95 %

## 2021-03-23 LAB
ANION GAP SERPL CALCULATED.3IONS-SCNC: 5 MMOL/L (ref 4–13)
BUN SERPL-MCNC: 17 MG/DL (ref 5–25)
CALCIUM SERPL-MCNC: 8.9 MG/DL (ref 8.3–10.1)
CHEST PAIN STATEMENT: NORMAL
CHLORIDE SERPL-SCNC: 103 MMOL/L (ref 100–108)
CO2 SERPL-SCNC: 28 MMOL/L (ref 21–32)
CREAT SERPL-MCNC: 1.04 MG/DL (ref 0.6–1.3)
GFR SERPL CREATININE-BSD FRML MDRD: 78 ML/MIN/1.73SQ M
GLUCOSE P FAST SERPL-MCNC: 123 MG/DL (ref 65–99)
GLUCOSE SERPL-MCNC: 123 MG/DL (ref 65–140)
HBV CORE AB SER QL: NORMAL
HBV CORE IGM SER QL: NORMAL
HBV SURFACE AG SER QL: NORMAL
HCV AB SER QL: NORMAL
MAGNESIUM SERPL-MCNC: 2 MG/DL (ref 1.6–2.6)
MAX DIASTOLIC BP: 76 MMHG
MAX HEART RATE: 111 BPM
MAX PREDICTED HEART RATE: 161 BPM
MAX. SYSTOLIC BP: 145 MMHG
POTASSIUM SERPL-SCNC: 4.5 MMOL/L (ref 3.5–5.3)
PROTOCOL NAME: NORMAL
REASON FOR TERMINATION: NORMAL
SODIUM SERPL-SCNC: 136 MMOL/L (ref 136–145)
TARGET HR FORMULA: NORMAL
TEST INDICATION: NORMAL
TIME IN EXERCISE PHASE: NORMAL

## 2021-03-23 PROCEDURE — 93017 CV STRESS TEST TRACING ONLY: CPT

## 2021-03-23 PROCEDURE — A9502 TC99M TETROFOSMIN: HCPCS

## 2021-03-23 PROCEDURE — G1004 CDSM NDSC: HCPCS

## 2021-03-23 PROCEDURE — 99214 OFFICE O/P EST MOD 30 MIN: CPT | Performed by: INTERNAL MEDICINE

## 2021-03-23 PROCEDURE — 93018 CV STRESS TEST I&R ONLY: CPT | Performed by: INTERNAL MEDICINE

## 2021-03-23 PROCEDURE — 93005 ELECTROCARDIOGRAM TRACING: CPT

## 2021-03-23 PROCEDURE — 80048 BASIC METABOLIC PNL TOTAL CA: CPT | Performed by: PHYSICIAN ASSISTANT

## 2021-03-23 PROCEDURE — 93016 CV STRESS TEST SUPVJ ONLY: CPT | Performed by: INTERNAL MEDICINE

## 2021-03-23 PROCEDURE — 99217 PR OBSERVATION CARE DISCHARGE MANAGEMENT: CPT | Performed by: FAMILY MEDICINE

## 2021-03-23 PROCEDURE — 93306 TTE W/DOPPLER COMPLETE: CPT | Performed by: INTERNAL MEDICINE

## 2021-03-23 PROCEDURE — 78452 HT MUSCLE IMAGE SPECT MULT: CPT

## 2021-03-23 PROCEDURE — 78452 HT MUSCLE IMAGE SPECT MULT: CPT | Performed by: INTERNAL MEDICINE

## 2021-03-23 PROCEDURE — 93306 TTE W/DOPPLER COMPLETE: CPT

## 2021-03-23 PROCEDURE — 83735 ASSAY OF MAGNESIUM: CPT | Performed by: PHYSICIAN ASSISTANT

## 2021-03-23 RX ADMIN — ASPIRIN 81 MG: 81 TABLET, COATED ORAL at 08:55

## 2021-03-23 RX ADMIN — LEVOTHYROXINE SODIUM 75 MCG: 75 TABLET ORAL at 05:14

## 2021-03-23 RX ADMIN — ATORVASTATIN CALCIUM 80 MG: 80 TABLET ORAL at 08:56

## 2021-03-23 RX ADMIN — REGADENOSON 0.4 MG: 0.08 INJECTION, SOLUTION INTRAVENOUS at 11:32

## 2021-03-23 NOTE — PLAN OF CARE
Problem: Potential for Falls  Goal: Patient will remain free of falls  Description: INTERVENTIONS:  - Assess patient frequently for physical needs  -  Identify cognitive and physical deficits and behaviors that affect risk of falls    -  [place] fall precautions as indicated by assessment   - Educate patient/family on patient safety including physical limitations  - Instruct patient to call for assistance with activity based on assessment  - Modify environment to reduce risk of injury  - Consider OT/PT consult to assist with strengthening/mobility  Outcome: Progressing     Problem: CARDIOVASCULAR - ADULT  Goal: Maintains optimal cardiac output and hemodynamic stability  Description: INTERVENTIONS:  - Monitor I/O, vital signs and rhythm  - Monitor for S/S and trends of decreased cardiac output  - Administer and titrate ordered vasoactive medications to optimize hemodynamic stability  - Assess quality of pulses, skin color and temperature  - Assess for signs of decreased coronary artery perfusion  - Instruct patient to report change in severity of symptoms  Outcome: Progressing  Goal: Absence of cardiac dysrhythmias or at baseline rhythm  Description: INTERVENTIONS:  - Continuous cardiac monitoring, vital signs, obtain 12 lead EKG if ordered  - Administer antiarrhythmic and heart rate control medications as ordered  - Monitor electrolytes and administer replacement therapy as ordered  Outcome: Progressing     Problem: PAIN - ADULT  Goal: Verbalizes/displays adequate comfort level or baseline comfort level  Description: Interventions:  - Encourage patient to monitor pain and request assistance  - Assess pain using appropriate pain scale  - Administer analgesics based on type and severity of pain and evaluate response  - Implement non-pharmacological measures as appropriate and evaluate response  - Consider cultural and social influences on pain and pain management  - Notify physician/advanced practitioner if interventions unsuccessful or patient reports new pain  Outcome: Progressing     Problem: INFECTION - ADULT  Goal: Absence or prevention of progression during hospitalization  Description: INTERVENTIONS:  - Assess and monitor for signs and symptoms of infection  - Monitor lab/diagnostic results  - Monitor all insertion sites, i e  indwelling lines, tubes, and drains  - Monitor endotracheal if appropriate and nasal secretions for changes in amount and color  - [place] appropriate cooling/warming therapies per order  - Administer medications as ordered  - Instruct and encourage patient and family to use good hand hygiene technique  - Identify and instruct in appropriate isolation precautions for identified infection/condition  Outcome: Progressing     Problem: SAFETY ADULT  Goal: Maintain or return to baseline ADL function  Description: INTERVENTIONS:  -  Assess patient's ability to carry out ADLs; assess patient's baseline for ADL function and identify physical deficits which impact ability to perform ADLs (bathing, care of mouth/teeth, toileting, grooming, dressing, etc )  - Assess/evaluate cause of self-care deficits   - Assess range of motion  - Assess patient's mobility; develop plan if impaired  - Assess patient's need for assistive devices and provide as appropriate  - Encourage maximum independence but intervene and supervise when necessary  - Involve family in performance of ADLs  - Assess for home care needs following discharge   - Consider OT consult to assist with ADL evaluation and planning for discharge  - Provide patient education as appropriate  Outcome: Progressing  Goal: Maintain or return mobility status to optimal level  Description: INTERVENTIONS:  - Assess patient's baseline mobility status (ambulation, transfers, stairs, etc )    - Identify cognitive and physical deficits and behaviors that affect mobility  - Identify mobility aids required to assist with transfers and/or ambulation (gait belt, sit-to-stand, lift, walker, cane, etc )  - [place] fall precautions as indicated by assessment  - Record patient progress and toleration of activity level on Mobility SBAR; progress patient to next Phase/Stage  - Instruct patient to call for assistance with activity based on assessment  - Consider rehabilitation consult to assist with strengthening/weightbearing, etc   Outcome: Progressing     Problem: DISCHARGE PLANNING  Goal: Discharge to home or other facility with appropriate resources  Description: INTERVENTIONS:  - Identify barriers to discharge w/patient and caregiver  - Arrange for needed discharge resources and transportation as appropriate  - Identify discharge learning needs (meds, wound care, etc )  - Arrange for interpretive services to assist at discharge as needed  - Refer to Case Management Department for coordinating discharge planning if the patient needs post-hospital services based on physician/advanced practitioner order or complex needs related to functional status, cognitive ability, or social support system  Outcome: Progressing     Problem: Knowledge Deficit  Goal: Patient/family/caregiver demonstrates understanding of disease process, treatment plan, medications, and discharge instructions  Description: Complete learning assessment and assess knowledge base    Interventions:  - Provide teaching at level of understanding  - Provide teaching via preferred learning methods  Outcome: Progressing

## 2021-03-23 NOTE — ASSESSMENT & PLAN NOTE
· Patient presented with intermittent chest pressure which has since resolved but he did have slight pressure during the stress test  · Initial EKG was unremarkable and troponins have remained negative  · Tyler= 2  · Patient was sent for a stress test today and echocardiogram has been ordered  · Cardiology input appreciated  · In the event that the stress test comes back normal, and the patient is asymptomatic, will discharge the patient home

## 2021-03-23 NOTE — PLAN OF CARE
Problem: Potential for Falls  Goal: Patient will remain free of falls  Description: INTERVENTIONS:  - Assess patient frequently for physical needs  -  Identify cognitive and physical deficits and behaviors that affect risk of falls    -  [place] fall precautions as indicated by assessment   - Educate patient/family on patient safety including physical limitations  - Instruct patient to call for assistance with activity based on assessment  - Modify environment to reduce risk of injury  - Consider OT/PT consult to assist with strengthening/mobility  Outcome: Progressing     Problem: CARDIOVASCULAR - ADULT  Goal: Maintains optimal cardiac output and hemodynamic stability  Description: INTERVENTIONS:  - Monitor I/O, vital signs and rhythm  - Monitor for S/S and trends of decreased cardiac output  - Administer and titrate ordered vasoactive medications to optimize hemodynamic stability  - Assess quality of pulses, skin color and temperature  - Assess for signs of decreased coronary artery perfusion  - Instruct patient to report change in severity of symptoms  Outcome: Progressing  Goal: Absence of cardiac dysrhythmias or at baseline rhythm  Description: INTERVENTIONS:  - Continuous cardiac monitoring, vital signs, obtain 12 lead EKG if ordered  - Administer antiarrhythmic and heart rate control medications as ordered  - Monitor electrolytes and administer replacement therapy as ordered  Outcome: Progressing     Problem: PAIN - ADULT  Goal: Verbalizes/displays adequate comfort level or baseline comfort level  Description: Interventions:  - Encourage patient to monitor pain and request assistance  - Assess pain using appropriate pain scale  - Administer analgesics based on type and severity of pain and evaluate response  - Implement non-pharmacological measures as appropriate and evaluate response  - Consider cultural and social influences on pain and pain management  - Notify physician/advanced practitioner if interventions unsuccessful or patient reports new pain  Outcome: Progressing     Problem: INFECTION - ADULT  Goal: Absence or prevention of progression during hospitalization  Description: INTERVENTIONS:  - Assess and monitor for signs and symptoms of infection  - Monitor lab/diagnostic results  - Monitor all insertion sites, i e  indwelling lines, tubes, and drains  - Monitor endotracheal if appropriate and nasal secretions for changes in amount and color  - [place] appropriate cooling/warming therapies per order  - Administer medications as ordered  - Instruct and encourage patient and family to use good hand hygiene technique  - Identify and instruct in appropriate isolation precautions for identified infection/condition  Outcome: Progressing     Problem: SAFETY ADULT  Goal: Maintain or return to baseline ADL function  Description: INTERVENTIONS:  -  Assess patient's ability to carry out ADLs; assess patient's baseline for ADL function and identify physical deficits which impact ability to perform ADLs (bathing, care of mouth/teeth, toileting, grooming, dressing, etc )  - Assess/evaluate cause of self-care deficits   - Assess range of motion  - Assess patient's mobility; develop plan if impaired  - Assess patient's need for assistive devices and provide as appropriate  - Encourage maximum independence but intervene and supervise when necessary  - Involve family in performance of ADLs  - Assess for home care needs following discharge   - Consider OT consult to assist with ADL evaluation and planning for discharge  - Provide patient education as appropriate  Outcome: Progressing  Goal: Maintain or return mobility status to optimal level  Description: INTERVENTIONS:  - Assess patient's baseline mobility status (ambulation, transfers, stairs, etc )    - Identify cognitive and physical deficits and behaviors that affect mobility  - Identify mobility aids required to assist with transfers and/or ambulation (gait belt, sit-to-stand, lift, walker, cane, etc )  - [place] fall precautions as indicated by assessment  - Record patient progress and toleration of activity level on Mobility SBAR; progress patient to next Phase/Stage  - Instruct patient to call for assistance with activity based on assessment  - Consider rehabilitation consult to assist with strengthening/weightbearing, etc   Outcome: Progressing     Problem: DISCHARGE PLANNING  Goal: Discharge to home or other facility with appropriate resources  Description: INTERVENTIONS:  - Identify barriers to discharge w/patient and caregiver  - Arrange for needed discharge resources and transportation as appropriate  - Identify discharge learning needs (meds, wound care, etc )  - Arrange for interpretive services to assist at discharge as needed  - Refer to Case Management Department for coordinating discharge planning if the patient needs post-hospital services based on physician/advanced practitioner order or complex needs related to functional status, cognitive ability, or social support system  Outcome: Progressing     Problem: Knowledge Deficit  Goal: Patient/family/caregiver demonstrates understanding of disease process, treatment plan, medications, and discharge instructions  Description: Complete learning assessment and assess knowledge base    Interventions:  - Provide teaching at level of understanding  - Provide teaching via preferred learning methods  Outcome: Progressing

## 2021-03-23 NOTE — ASSESSMENT & PLAN NOTE
· History of five-vessel bypass about 10 years ago  · Continue aspirin, statin  · Patient had a stress test today-await results

## 2021-03-23 NOTE — ASSESSMENT & PLAN NOTE
· Patient presented with intermittent chest pressure which has since resolved but he did have slight pressure during the stress test  · Initial EKG was unremarkable and troponins have remained negative  · Tyler= 2  · Patient was sent for a stress test today and echocardiogram has been ordered  · Cardiology input appreciated- discussed with Dr Tyler Benson, nuclear study is normal per his read  Patient may go home     · Follow up on echocardiogram results as an outpatient

## 2021-03-23 NOTE — ASSESSMENT & PLAN NOTE
· Continue Lipitor 80 milligram p o   Daily  · Cholesterol not at goal  Has familial hypercholesterolemia

## 2021-03-23 NOTE — PROGRESS NOTES
[facility]  Progress Note Annis Schilder 1960, 62 y o  male MRN: [id number]  Unit/Bed#: [address] [facility] Encounter: [id number]  Primary Care Provider: Noble Méndez MD   Date and time admitted to hospital: 3/22/2021  9:46 AM    * Chest pain  Assessment & Plan  · Patient presented with intermittent chest pressure which has since resolved but he did have slight pressure during the stress test  · Initial EKG was unremarkable and troponins have remained negative  · Tyler= 2  · Patient was sent for a stress test today and echocardiogram has been ordered  · Cardiology input appreciated  · In the event that the stress test comes back normal, and the patient is asymptomatic, will discharge the patient home  CAD (coronary artery disease) of artery bypass graft  Assessment & Plan  · History of five-vessel bypass about 10 years ago  · Continue aspirin, statin  · Patient had a stress test today-await results    Pure hypercholesterolemia  Assessment & Plan  · Continue Lipitor 80 milligram p o  Daily  · Cholesterol not at goal  Has familial hypercholesterolemia         VTE Pharmacologic Prophylaxis: VTE Score: 2 Moderate Risk (Score 3-4) - Pharmacological DVT Prophylaxis Ordered: Enoxaparin (Lovenox)  Patient Centered Rounds: I have performed bedside rounds with nursing staff today  Discussions with Specialists or Other Care Team Provider: Primary RN    Education and Discussions with Family / Patient: Updated  (wife) at bedside  Time Spent for Care: 20 minutes  More than 50% of total time spent on counseling and coordination of care as described above  Current Length of Stay: 0 day(s)  Current Patient Status: Observation   Certification Statement: If the patient's stress test is negative for ischemia, then the patient can lilkley be discharged home     Discharge Plan: see above    Code Status: Level 1 - Full Code    Subjective:   Patient offers no complaints of chest pain and is requesting to be discharged home  He states his stress test went well except he did have slight flushed feeling after the medication was delivered  He had no chest pain  He has no shortness of breath  He has no fever or chills  He states he has been very active over the last 3 months without a rest   He does feel as though this was contributing to his symptoms upon admission  Objective:     Vitals:   Temp (24hrs), Av 8 °F (36 6 °C), Min:97 5 °F (36 4 °C), Max:98 3 °F (36 8 °C)    Temp:  [97 5 °F (36 4 °C)-98 3 °F (36 8 °C)] 98 °F (36 7 °C)  HR:  [56-77] 76  Resp:  [16-18] 16  BP: (135-143)/(77-83) 136/77  SpO2:  [94 %-97 %] 97 %  Body mass index is 28 56 kg/m²  Input and Output Summary (last 24 hours):   No intake or output data in the 24 hours ending 21 1443    Physical Exam:   Physical Exam  Constitutional:       Appearance: Normal appearance  He is not ill-appearing  HENT:      Head: Normocephalic  Nose: Nose normal       Mouth/Throat:      Mouth: Mucous membranes are moist    Neck:      Musculoskeletal: Normal range of motion and neck supple  Cardiovascular:      Rate and Rhythm: Normal rate and regular rhythm  Pulses: Normal pulses  Heart sounds: Normal heart sounds  No murmur  Pulmonary:      Effort: Pulmonary effort is normal  No respiratory distress  Breath sounds: Normal breath sounds  Abdominal:      General: Abdomen is flat  Palpations: Abdomen is soft  Musculoskeletal: Normal range of motion  Skin:     General: Skin is warm and dry  Capillary Refill: Capillary refill takes less than 2 seconds  Neurological:      General: No focal deficit present  Mental Status: He is alert and oriented to person, place, and time     Psychiatric:         Mood and Affect: Mood normal          Behavior: Behavior normal             Additional Data:     Labs:  Results from last 7 days   Lab Units 21  1002   WBC Thousand/uL 6 63 HEMOGLOBIN g/dL 15 6   HEMATOCRIT % 45 2   PLATELETS Thousands/uL 170   NEUTROS PCT % 57   LYMPHS PCT % 31   MONOS PCT % 7   EOS PCT % 4     Results from last 7 days   Lab Units 03/23/21  0449 03/22/21  1002   SODIUM mmol/L 136 137   POTASSIUM mmol/L 4 5 4 2   CHLORIDE mmol/L 103 102   CO2 mmol/L 28 27   BUN mg/dL 17 18   CREATININE mg/dL 1 04 1 05   ANION GAP mmol/L 5 8   CALCIUM mg/dL 8 9 8 8   ALBUMIN g/dL  --  4 1   TOTAL BILIRUBIN mg/dL  --  0 50   ALK PHOS U/L  --  58   ALT U/L  --  54   AST U/L  --  31   GLUCOSE RANDOM mg/dL 123 106                       Lines/Drains:  Invasive Devices     Peripheral Intravenous Line            Peripheral IV 03/22/21 Right Antecubital 1 day                Telemetry:        Imaging: Reviewed radiology reports from this admission including: chest xray    Recent Cultures (last 7 days):         Last 24 Hours Medication List:   Current Facility-Administered Medications   Medication Dose Route Frequency Provider Last Rate    acetaminophen  650 mg Oral Q6H PRN Maxine Hook MD      aspirin  81 mg Oral Daily Maxine Hook MD      atorvastatin  80 mg Oral Daily With Faye Hamptno MD      enoxaparin  40 mg Subcutaneous Daily Maxine Hook MD      levothyroxine  75 mcg Oral Early Morning Maxine Hook MD      ondansetron  4 mg Intravenous Q6H PRN Maxine Hook MD      sertraline  50 mg Oral HS Maxine Hook MD          Today, Patient Was Seen By: EUGENIA Pretty    ** Please Note: Dictation voice to text software may have been used in the creation of this document   **

## 2021-03-23 NOTE — PROGRESS NOTES
AVS reviewed with patient, all questions answered at this time  IV removed  Monitoring systems removed  Patient left via walking accompanied by wife

## 2021-03-23 NOTE — PROGRESS NOTES
Progress Note - Cardiology   [facility]     Shirley Kehr 62 y o  male MRN: [id number]  : 1960  Unit/Bed#: [address] [place] [address] Encounter: [id number]    Assessment and Plan:   1  Chest pressure:  History of coronary artery disease and CABG x5 approximately 10 years ago  -  troponins negative x3    -  continue Lipitor    -  continue aspirin 81 mg daily    -  Lexiscan nuclear stress test today for further evaluation  2  Hypothyroidism:  Continue levothyroxine    3  Dyslipidemia:  Continue statin therapy    4  Anxiety     Subjective / Objective:   Patient seen and examined  No further chest pain noted  He is scheduled for Lexiscan nuclear stress test today for further evaluation  Vitals: Blood pressure 136/77, pulse 76, temperature 98 °F (36 7 °C), resp  rate 16, height 5' 9" (1 753 m), weight 87 7 kg (193 lb 6 4 oz), SpO2 97 %  Vitals:    21 0951 21 1246   Weight: 88 5 kg (195 lb) 87 7 kg (193 lb 6 4 oz)     Body mass index is 28 56 kg/m²  BP Readings from Last 3 Encounters:   21 136/77   21 136/80   20 145/79     Orthostatic Blood Pressures      Most Recent Value   Blood Pressure  136/77 filed at 2021 7968   Patient Position - Orthostatic VS  Lying filed at 2021 9536        I/O     None        Invasive Devices     Peripheral Intravenous Line            Peripheral IV 21 Right Antecubital 1 day                No intake or output data in the 24 hours ending 21 1158      Physical Exam:   Physical Exam  Vitals signs and nursing note reviewed  Constitutional:       General: He is not in acute distress  Appearance: Normal appearance  He is well-developed and normal weight  HENT:      Head: Normocephalic  Right Ear: External ear normal       Left Ear: External ear normal       Nose: Nose normal    Eyes:      General: No scleral icterus  Right eye: No discharge  Left eye: No discharge        Pupils: Pupils are equal, round, and reactive to light  Neck:      Musculoskeletal: Normal range of motion and neck supple  Thyroid: No thyromegaly  Cardiovascular:      Rate and Rhythm: Normal rate and regular rhythm  Pulses: Normal pulses  Heart sounds: Normal heart sounds  No murmur  Pulmonary:      Effort: Pulmonary effort is normal  No respiratory distress  Breath sounds: Normal breath sounds  No wheezing, rhonchi or rales  Abdominal:      General: Bowel sounds are normal  There is no distension  Palpations: Abdomen is soft  Musculoskeletal:      Right lower leg: No edema  Left lower leg: No edema  Skin:     General: Skin is warm and dry  Capillary Refill: Capillary refill takes less than 2 seconds  Neurological:      General: No focal deficit present  Mental Status: He is alert and oriented to person, place, and time  Mental status is at baseline  Psychiatric:         Mood and Affect: Mood normal          Behavior: Behavior normal          Thought Content:  Thought content normal          Judgment: Judgment normal                  Medications/ Allergies:     Current Facility-Administered Medications   Medication Dose Route Frequency Provider Last Rate    acetaminophen  650 mg Oral Q6H PRN Stephania Lipscomb MD      aspirin  81 mg Oral Daily Stephania Lipscomb MD      atorvastatin  80 mg Oral Daily With Rivka Sandra MD      enoxaparin  40 mg Subcutaneous Daily Stephania Lipscomb MD      levothyroxine  75 mcg Oral Early Morning Stephania Lipscomb MD      ondansetron  4 mg Intravenous Q6H PRN Stephania Lipscomb MD      sertraline  50 mg Oral HS Stephania Lipscomb MD       acetaminophen, 650 mg, Q6H PRN  ondansetron, 4 mg, Q6H PRN      No Known Allergies    VTE Pharmacologic Prophylaxis:   Sequential compression device (Venodyne)     Labs:   Troponins:  Results from last 7 days   Lab Units 03/22/21  1701 03/22/21  1358 03/22/21  1002   TROPONIN I ng/mL <0 02 <0 02 <0 02     CBC with diff:  Results from last 7 days   Lab Units 03/22/21  1002   WBC Thousand/uL 6 63   HEMOGLOBIN g/dL 15 6   HEMATOCRIT % 45 2   MCV fL 92   PLATELETS Thousands/uL 170   MCH pg 31 7   MCHC g/dL 34 5   RDW % 11 5*   MPV fL 10 1   NRBC AUTO /100 WBCs 0     CMP:  Results from last 7 days   Lab Units 03/23/21  0449 03/22/21  1002   SODIUM mmol/L 136 137   POTASSIUM mmol/L 4 5 4 2   CHLORIDE mmol/L 103 102   CO2 mmol/L 28 27   ANION GAP mmol/L 5 8   BUN mg/dL 17 18   CREATININE mg/dL 1 04 1 05   GLUCOSE FASTING mg/dL 123*  --    CALCIUM mg/dL 8 9 8 8   AST U/L  --  31   ALT U/L  --  54   ALK PHOS U/L  --  58   TOTAL PROTEIN g/dL  --  8 0   ALBUMIN g/dL  --  4 1   TOTAL BILIRUBIN mg/dL  --  0 50   EGFR ml/min/1 73sq m 78 77     Magnesium:  Results from last 7 days   Lab Units 03/23/21  0449 03/22/21  1002   MAGNESIUM mg/dL 2 0 2 0     Imaging & Testing   I have personally reviewed pertinent reports  Xr Chest 1 View Portable    Result Date: 3/22/2021  Narrative: CHEST INDICATION:   pain  COMPARISON:  None EXAM PERFORMED/VIEWS:  XR CHEST PORTABLE Images: 2 FINDINGS: Sternal wires Cardiomediastinal silhouette appears unremarkable  The lungs are clear  No pneumothorax or pleural effusion  Osseous structures appear within normal limits for patient age  Impression: No acute cardiopulmonary disease  Workstation performed: [id number]        EKG / Monitor: Personally reviewed  Sinus rhythm    Cardiac testing:   Nuclear stress test pending          Zion Jett        "This note has been constructed using a voice recognition system  Therefore there may be syntax, spelling, and/or grammatical errors   Please call if you have any questions  "

## 2021-03-23 NOTE — UTILIZATION REVIEW
Initial Clinical Review    Admission: Date/Time/Statement:   Admission Orders (From admission, onward)     Ordered        03/22/21 1123  Place in Observation  Once                   Orders Placed This Encounter   Procedures    Place in Observation     Standing Status:   Standing     Number of Occurrences:   1     Order Specific Question:   Level of Care     Answer:   Med Surg [16]     ED Arrival Information     Expected Arrival Acuity Means of Arrival Escorted By Service Admission Type    - 3/22/2021 09:42 Urgent Walk-In Self Hospitalist Urgent    Arrival Complaint    abnormal ekg        Chief Complaint   Patient presents with    Chest Pain     Patient was at [facility] and had an abnormal EKG,was sent here,having chest pressure mid chest and to the left chest,states he has been having discomfort for a long time and thinks because he does alot of physical work but states today feels different     Assessment/Plan: 62 y o  male with past medical of CAD status post CABG, hypothyroidism, hyperlipidemia who presents with intermittent chest tightness  Patient reports that he has a very physically active person  Patient id a lot of physical activity yesterday during which time patient had some chest tightness and also felt dizziness when he was bending over the point of passing out  Patient has been having intermittent chest tightness for the past 3-4 days  Patient has a shortness of breath, palpitations acute chest pain  Patient had bypass surgery almost 10 years ago but did not follow up with the cardiologist after that  In the ED patient's vital signs were stable on troponins were negative  EKG was unremarkable      Chest pain  Assessment & Plan  Patient presented with intermittent chest pressure  Initial EKG was unremarkable and troponin was negative  Patient will be scheduled for exercise nuclear stress test in a    Along with 2D echo  Cardiology consult appreciated  Patient's MAIK score is 2     CAD (coronary artery disease) of artery bypass graft  Assessment & Plan  History of five-vessel bypass about 10 years ago  Number follow up with Cardiology  Continue aspirin, statin  Patient is scheduled for nuclear stress test and echo in a m      Hypothyroidism  Assessment & Plan  Continue levothyroxine 75 microgram p o  Daily     Pure hypercholesterolemia  Assessment & Plan  Continue Lipitor 80 milligram p o  Daily  Check fasting lipid panel in am        VTE Prophylaxis: Enoxaparin (Lovenox)  / sequential compression device   Code Status:  Full code        Anticipated Length of Stay:  Patient will be admitted on an Observation basis with an anticipated length of stay of less than 2 midnights     Justification for Hospital Stay:  Chest pain  ED Triage Vitals   Temperature Pulse Respirations Blood Pressure SpO2   03/22/21 0951 03/22/21 0951 03/22/21 0951 03/22/21 0951 03/22/21 0951   97 9 °F (36 6 °C) 60 18 169/81 97 %      Temp Source Heart Rate Source Patient Position - Orthostatic VS BP Location FiO2 (%)   03/22/21 0951 03/22/21 0951 03/22/21 0951 03/22/21 0951 --   Tympanic Monitor Lying Left arm       Pain Score       03/22/21 1335       No Pain          Wt Readings from Last 1 Encounters:   03/22/21 87 7 kg (193 lb 6 4 oz)     Additional Vital Signs:   Pertinent Labs/Diagnostic Test Results:       Results from last 7 days   Lab Units 03/22/21  1002   WBC Thousand/uL 6 63   HEMOGLOBIN g/dL 15 6   HEMATOCRIT % 45 2   PLATELETS Thousands/uL 170   NEUTROS ABS Thousands/µL 3 72         Results from last 7 days   Lab Units 03/23/21  0449 03/22/21  1002   SODIUM mmol/L 136 137   POTASSIUM mmol/L 4 5 4 2   CHLORIDE mmol/L 103 102   CO2 mmol/L 28 27   ANION GAP mmol/L 5 8   BUN mg/dL 17 18   CREATININE mg/dL 1 04 1 05   EGFR ml/min/1 73sq m 78 77   CALCIUM mg/dL 8 9 8 8   MAGNESIUM mg/dL 2 0 2 0     Results from last 7 days   Lab Units 03/22/21  1002   AST U/L 31   ALT U/L 54   ALK PHOS U/L 58   TOTAL PROTEIN g/dL 8 0   ALBUMIN g/dL 4  1   TOTAL BILIRUBIN mg/dL 0 50         Results from last 7 days   Lab Units 03/23/21  0449 03/22/21  1002   GLUCOSE RANDOM mg/dL 123 106             No results found for: BETA-HYDROXYBUTYRATE                   Results from last 7 days   Lab Units 03/22/21  1701 03/22/21  1358 03/22/21  1002   TROPONIN I ng/mL <0 02 <0 02 <0 02                                                                                                       ED Treatment:   Medication Administration from 03/22/2021 0942 to 03/22/2021 1222     None        Past Medical History:   Diagnosis Date    Anxiety     CAD (coronary artery disease)     Colon polyp     Hyperlipidemia      Present on Admission:   CAD (coronary artery disease) of artery bypass graft   Hypothyroidism   Pure hypercholesterolemia      Admitting Diagnosis: Chest pain [R07 9]  Age/Sex: 62 y o  male  Admission Orders:  Scheduled Medications:  aspirin, 81 mg, Oral, Daily  atorvastatin, 80 mg, Oral, Daily With Dinner  enoxaparin, 40 mg, Subcutaneous, Daily  levothyroxine, 75 mcg, Oral, Early Morning  sertraline, 50 mg, Oral, HS      Continuous IV Infusions:     PRN Meds:  acetaminophen, 650 mg, Oral, Q6H PRN  ondansetron, 4 mg, Intravenous, Q6H PRN        IP CONSULT TO CARDIOLOGY    Network Utilization Review Department  ATTENTION: Please call with any questions or concerns to [phone] and carefully listen to the prompts so that you are directed to the right person  All voicemails are confidential   Edie Brownlee all requests for admission clinical reviews, approved or denied determinations and any other requests to dedicated fax number below belonging to the campus where the patient is receiving treatment   List of dedicated fax numbers for the Facilities:  [address] DENIALS (Administrative/Medical Necessity) [phone]   [address] (Maternity/NICU/Pediatrics) 261 [facility],7Th Floor [phone]   Humberto Hoyos [address]  [phone]   Rachel Vasquez [address] ([address]) [address] [address] [address] [place] [address] [phone]   [address] 951 [phone]

## 2021-03-23 NOTE — DISCHARGE SUMMARY
Joshua 46  Discharge- [facility] 1960, 62 y o  male MRN: [id number]  Unit/Bed#: [address] [address] Encounter: [id number]  Primary Care Provider: Garfield Ambrosio MD   Date and time admitted to hospital: 3/22/2021  9:46 AM    * Chest pain  Assessment & Plan  · Patient presented with intermittent chest pressure which has since resolved but he did have slight pressure during the stress test  · Initial EKG was unremarkable and troponins have remained negative  · Tyler= 2  · Patient was sent for a stress test today and echocardiogram has been ordered  · Cardiology input appreciated- discussed with Dr Gavi Cui, nuclear study is normal per his read  Patient may go home  · Follow up on echocardiogram results as an outpatient     CAD (coronary artery disease) of artery bypass graft  Assessment & Plan  · History of five-vessel bypass about 10 years ago  · Continue aspirin, statin  · Patient had a stress test today and was normal by Dr Christine lang  Pure hypercholesterolemia  Assessment & Plan  · Continue Lipitor 80 milligram p o  Daily  · Cholesterol not at goal  Has familial hypercholesterolemia     Hypothyroidism  Assessment & Plan  · Continue levothyroxine 75 microgram p o  Daily      Discharging Physician / Practitioner: EUGENIA Romero  PCP: Garfield Ambrosio MD  Admission Date:   Admission Orders (From admission, onward)     Ordered        03/22/21 1123  Place in Observation  Once                   Discharge Date: 03/23/21    Resolved Problems  Date Reviewed: 3/23/2021    None          Consultations During Hospital Stay:  · Cardiology-Dr Gavi Cui    Procedures Performed:   · Nuclear stress test which was "normal" per Dr Gavi Cui    Significant Findings / Test Results:   · None    Incidental Findings:   · None     Test Results Pending at Discharge (will require follow up): · Echocardiogram was done on March 23, 2021 and the results were pending at the time of discharge    Patient can follow-up with his primary care provider for those results  Outpatient Tests Requested:  · None    Complications:  None    Reason for Admission:  Midsternal chest pressure    Hospital Course: Venita Mccabe is a 62 y o  male patient who originally presented to the hospital on 3/22/2021 due to midsternal chest pressure  Patient has a significant coronary artery disease history and had bypass surgery when he was about 53years old  Due to his risk factors, current medical comorbidities, and complaints the patient was admitted to the hospital   He was under observation  A stress test was ordered, Cardiology was consulted, and an echocardiogram was done  Troponins were negative x3 sets  Patient did not have any recurrence of symptoms while admitted to the hospital   The cardiology team did follow along and upon discussion with Dr Ger Mead at  the time of discharge, the stress test was read as normal   Patient is relieved and requesting to go home  The echocardiogram has been done however the results are not available at the time of this dictation  These results can be followed up on with the patient's primary care provider in the outpatient setting  Please see above list of diagnoses and related plan for additional information  Condition at Discharge: stable     Discharge Day Visit / Exam:     * Please refer to separate progress note for these details *    Discussion with Family:  Wife at bedside    Discharge instructions/Information to patient and family:   See after visit summary for information provided to patient and family  Provisions for Follow-Up Care:  See after visit summary for information related to follow-up care and any pertinent home health orders        Disposition:     Home    For Discharges to [facility] SNF:   · Not Applicable to this Patient - Not Applicable to this Patient    Planned Readmission:  Not anticipated     Discharge Statement:  I spent 42 minutes discharging the patient  This time was spent on the day of discharge  I had direct contact with the patient on the day of discharge  Greater than 50% of the total time was spent examining patient, answering all patient questions, arranging and discussing plan of care with patient as well as directly providing post-discharge instructions  Additional time then spent on discharge activities  Discharge Medications:  See after visit summary for reconciled discharge medications provided to patient and family        ** Please Note: This note has been constructed using a voice recognition system **

## 2021-03-23 NOTE — ASSESSMENT & PLAN NOTE
· History of five-vessel bypass about 10 years ago  · Continue aspirin, statin  · Patient had a stress test today and was normal by Dr Sugar lang

## 2021-03-24 ENCOUNTER — TELEPHONE (OUTPATIENT)
Dept: FAMILY MEDICINE CLINIC | Facility: CLINIC | Age: 61
End: 2021-03-24

## 2021-03-24 LAB
ATRIAL RATE: 70 BPM
P AXIS: 82 DEGREES
PR INTERVAL: 156 MS
QRS AXIS: 78 DEGREES
QRSD INTERVAL: 94 MS
QT INTERVAL: 310 MS
QTC INTERVAL: 334 MS
T WAVE AXIS: 90 DEGREES
VENTRICULAR RATE: 70 BPM

## 2021-03-24 PROCEDURE — 93010 ELECTROCARDIOGRAM REPORT: CPT | Performed by: INTERNAL MEDICINE

## 2021-03-24 NOTE — TELEPHONE ENCOUNTER
LMOM for Erica Yu to schedule a follow up/physical with Dr Jennifer Cates next week  Per Dr Jennifer Cates ok to use same day

## 2021-03-30 DIAGNOSIS — Z23 ENCOUNTER FOR IMMUNIZATION: ICD-10-CM

## 2021-04-10 ENCOUNTER — IMMUNIZATIONS (OUTPATIENT)
Dept: FAMILY MEDICINE CLINIC | Facility: HOSPITAL | Age: 61
End: 2021-04-10

## 2021-04-10 DIAGNOSIS — Z23 ENCOUNTER FOR IMMUNIZATION: Primary | ICD-10-CM

## 2021-04-10 PROCEDURE — 0001A SARS-COV-2 / COVID-19 MRNA VACCINE (PFIZER-BIONTECH) 30 MCG: CPT

## 2021-04-10 PROCEDURE — 91300 SARS-COV-2 / COVID-19 MRNA VACCINE (PFIZER-BIONTECH) 30 MCG: CPT

## 2021-05-03 ENCOUNTER — IMMUNIZATIONS (OUTPATIENT)
Dept: FAMILY MEDICINE CLINIC | Facility: HOSPITAL | Age: 61
End: 2021-05-03

## 2021-05-03 DIAGNOSIS — Z23 ENCOUNTER FOR IMMUNIZATION: Primary | ICD-10-CM

## 2021-05-03 PROCEDURE — 0002A SARS-COV-2 / COVID-19 MRNA VACCINE (PFIZER-BIONTECH) 30 MCG: CPT

## 2021-05-03 PROCEDURE — 91300 SARS-COV-2 / COVID-19 MRNA VACCINE (PFIZER-BIONTECH) 30 MCG: CPT

## 2022-01-19 DIAGNOSIS — B34.9 VIRAL ILLNESS: Primary | ICD-10-CM

## 2022-06-17 DIAGNOSIS — E78.00 PURE HYPERCHOLESTEROLEMIA: Primary | ICD-10-CM

## 2022-06-17 NOTE — TELEPHONE ENCOUNTER
Patient is requesting a 90 day supply and does not wish to come into the office any time soon if he can avoid it  Please call to advise

## 2022-06-20 DIAGNOSIS — E03.9 ACQUIRED HYPOTHYROIDISM: ICD-10-CM

## 2022-06-20 DIAGNOSIS — E78.00 PURE HYPERCHOLESTEROLEMIA: Primary | ICD-10-CM

## 2022-06-20 RX ORDER — ATORVASTATIN CALCIUM 80 MG/1
80 TABLET, FILM COATED ORAL DAILY
Qty: 90 TABLET | Refills: 0 | Status: SHIPPED | OUTPATIENT
Start: 2022-06-20

## 2022-06-20 NOTE — TELEPHONE ENCOUNTER
I havent seen him since 2019 and I cant continue to refill meds due to medical and legal reasons  I do have to see him for a visit soon  I ordered labs for him to get it done as well   Please get them done

## 2022-06-27 ENCOUNTER — OFFICE VISIT (OUTPATIENT)
Dept: FAMILY MEDICINE CLINIC | Facility: CLINIC | Age: 62
End: 2022-06-27
Payer: COMMERCIAL

## 2022-06-27 VITALS
OXYGEN SATURATION: 97 % | RESPIRATION RATE: 16 BRPM | HEIGHT: 69 IN | DIASTOLIC BLOOD PRESSURE: 80 MMHG | HEART RATE: 72 BPM | SYSTOLIC BLOOD PRESSURE: 130 MMHG | TEMPERATURE: 98.1 F | BODY MASS INDEX: 28.71 KG/M2 | WEIGHT: 193.8 LBS

## 2022-06-27 DIAGNOSIS — Z11.4 SCREENING FOR HIV (HUMAN IMMUNODEFICIENCY VIRUS): ICD-10-CM

## 2022-06-27 DIAGNOSIS — E03.9 ACQUIRED HYPOTHYROIDISM: ICD-10-CM

## 2022-06-27 DIAGNOSIS — Z00.00 ANNUAL PHYSICAL EXAM: Primary | ICD-10-CM

## 2022-06-27 DIAGNOSIS — E78.00 PURE HYPERCHOLESTEROLEMIA: ICD-10-CM

## 2022-06-27 DIAGNOSIS — F33.42 RECURRENT MAJOR DEPRESSIVE DISORDER, IN FULL REMISSION (HCC): ICD-10-CM

## 2022-06-27 DIAGNOSIS — Z12.11 COLON CANCER SCREENING: ICD-10-CM

## 2022-06-27 PROBLEM — R07.9 CHEST PAIN: Status: RESOLVED | Noted: 2021-03-22 | Resolved: 2022-06-27

## 2022-06-27 PROCEDURE — 1036F TOBACCO NON-USER: CPT | Performed by: FAMILY MEDICINE

## 2022-06-27 PROCEDURE — 3725F SCREEN DEPRESSION PERFORMED: CPT | Performed by: FAMILY MEDICINE

## 2022-06-27 PROCEDURE — 3008F BODY MASS INDEX DOCD: CPT | Performed by: FAMILY MEDICINE

## 2022-06-27 PROCEDURE — 99396 PREV VISIT EST AGE 40-64: CPT | Performed by: FAMILY MEDICINE

## 2022-06-27 NOTE — PROGRESS NOTES
[address]    NAME: Zuleima Lopez  AGE: 65 y o  SEX: male  : 1960     DATE: 2022     Assessment and Plan:     Problem List Items Addressed This Visit        Endocrine    Hypothyroidism     Stable  To get labs done               Other    Pure hypercholesterolemia     Atorvastatin as directed            Recurrent major depressive disorder, in full remission ([facility] [address] )     Stable on zoloft              Other Visit Diagnoses     Annual physical exam    -  Primary    Relevant Orders    PSA Total, Diagnostic    CBC and differential    Comprehensive metabolic panel    TSH, 3rd generation with Free T4 reflex    Lipid Panel with Direct LDL reflex    HIV 1/2 ANTIGEN/ANTIBODY (4TH GENERATION) W REFLEX [facility]    Screening for HIV (human immunodeficiency virus)        Colon cancer screening        Relevant Orders    Ambulatory referral for colonoscopy          Immunizations and preventive care screenings were discussed with patient today  Appropriate education was printed on patient's after visit summary  Counseling:  Alcohol/drug use: discussed moderation in alcohol intake, the recommendations for healthy alcohol use, and avoidance of illicit drug use  Dental Health: discussed importance of regular tooth brushing, flossing, and dental visits  Injury prevention: discussed safety/seat belts, safety helmets, smoke detectors, carbon dioxide detectors, and smoking near bedding or upholstery  Sexual health: discussed sexually transmitted diseases, partner selection, use of condoms, avoidance of unintended pregnancy, and contraceptive alternatives  Exercise: the importance of regular exercise/physical activity was discussed  Recommend exercise 3-5 times per week for at least 30 minutes  BMI Counseling: Body mass index is 29 04 kg/m²   The BMI is above normal  Nutrition recommendations include decreasing portion sizes and encouraging healthy choices of fruits and vegetables  Exercise recommendations include moderate physical activity 150 minutes/week  No pharmacotherapy was ordered  Rationale for BMI follow-up plan is due to patient being overweight or obese  No follow-ups on file  Chief Complaint:     Chief Complaint   Patient presents with    Physical Exam     Patient is here today for an annual exam       History of Present Illness:     Adult Annual Physical   Patient here for a comprehensive physical exam  The patient reports no problems  Diet and Physical Activity  Diet/Nutrition: well balanced diet and consuming 3-5 servings of fruits/vegetables daily  Exercise: walking  Depression Screening  PHQ-2/9 Depression Screening    Little interest or pleasure in doing things: 0 - not at all  Feeling down, depressed, or hopeless: 0 - not at all  Trouble falling or staying asleep, or sleeping too much: 0 - not at all  Feeling tired or having little energy: 0 - not at all  Poor appetite or overeatin - not at all  Feeling bad about yourself - or that you are a failure or have let yourself or your family down: 0 - not at all  Trouble concentrating on things, such as reading the newspaper or watching television: 0 - not at all  Moving or speaking so slowly that other people could have noticed  Or the opposite - being so fidgety or restless that you have been moving around a lot more than usual: 0 - not at all  Thoughts that you would be better off dead, or of hurting yourself in some way: 0 - not at all  PHQ-9 Score: 0   PHQ-9 Interpretation: No or Minimal depression        General Health  Sleep: sleeps well and gets 7-8 hours of sleep on average  Hearing: normal - bilateral   Vision: most recent eye exam >1 year ago and previous LASIK surgery  Readers   Dental: regular dental visits and brushes teeth twice daily   Health  Symptoms include: none     Review of Systems:     Review of Systems   Constitutional: Negative  HENT: Negative  Eyes: Negative  Respiratory: Negative  Cardiovascular: Negative  Gastrointestinal: Negative  Endocrine: Negative  Genitourinary: Negative  Musculoskeletal: Negative  Skin: Negative  Allergic/Immunologic: Negative  Neurological: Negative  Hematological: Negative  Psychiatric/Behavioral: Negative         Past Medical History:     Past Medical History:   Diagnosis Date    Anxiety     CAD (coronary artery disease)     Colon polyp     Hyperlipidemia       Past Surgical History:     Past Surgical History:   Procedure Laterality Date    COLONOSCOPY      CORONARY ARTERY BYPASS GRAFT      2011      Family History:     Family History   Problem Relation Age of Onset    Diabetes Mother     Lung cancer Mother     No Known Problems Father       Social History:     Social History     Socioeconomic History    Marital status: Unknown     Spouse name: None    Number of children: None    Years of education: None    Highest education level: None   Occupational History    None   Tobacco Use    Smoking status: Former Smoker     Packs/day: 1 00     Years: 12 00     Pack years: 12 00    Smokeless tobacco: Never Used   Substance and Sexual Activity    Alcohol use: Never    Drug use: Never    Sexual activity: Yes     Partners: Female   Other Topics Concern    None   Social History Narrative    None     Social Determinants of Health     Financial Resource Strain: Not on file   Food Insecurity: Not on file   Transportation Needs: Not on file   Physical Activity: Not on file   Stress: Not on file   Social Connections: Not on file   Intimate Partner Violence: Not on file   Housing Stability: Not on file      Current Medications:     Current Outpatient Medications   Medication Sig Dispense Refill    aspirin (ECOTRIN LOW STRENGTH) 81 mg EC tablet Take 81 mg by mouth daily      atorvastatin (LIPITOR) 80 mg tablet Take 1 tablet (80 mg total) by mouth daily 90 tablet 0    levothyroxine 75 mcg tablet Take 75 mcg by mouth daily in the early morning      sertraline (ZOLOFT) 100 mg tablet Take 100 mg by mouth daily       No current facility-administered medications for this visit  Allergies:     No Known Allergies   Physical Exam:     /80 (BP Location: Left arm, Patient Position: Sitting, Cuff Size: Adult)   Pulse 72   Temp 98 1 °F (36 7 °C) (Tympanic)   Resp 16   Ht 5' 8 5" (1 74 m)   Wt 87 9 kg (193 lb 12 8 oz)   SpO2 97%   BMI 29 04 kg/m²     Physical Exam  Vitals and nursing note reviewed  Constitutional:       Appearance: Normal appearance  He is well-developed  HENT:      Head: Normocephalic and atraumatic  Right Ear: Tympanic membrane normal       Left Ear: Tympanic membrane normal       Nose: Nose normal       Mouth/Throat:      Mouth: Mucous membranes are moist    Eyes:      Pupils: Pupils are equal, round, and reactive to light  Cardiovascular:      Rate and Rhythm: Normal rate and regular rhythm  Pulses: Normal pulses  Heart sounds: Normal heart sounds  Pulmonary:      Effort: Pulmonary effort is normal       Breath sounds: Normal breath sounds  Abdominal:      Palpations: Abdomen is soft  Musculoskeletal:         General: Normal range of motion  Cervical back: Normal range of motion and neck supple  Skin:     General: Skin is warm  Capillary Refill: Capillary refill takes less than 2 seconds  Neurological:      Mental Status: He is alert and oriented to person, place, and time     Psychiatric:         Behavior: Behavior normal           Luz Beaulieu MD  [address] [facility]

## 2022-06-27 NOTE — PATIENT INSTRUCTIONS

## 2022-07-07 ENCOUNTER — TELEMEDICINE (OUTPATIENT)
Dept: FAMILY MEDICINE CLINIC | Facility: CLINIC | Age: 62
End: 2022-07-07
Payer: COMMERCIAL

## 2022-07-07 DIAGNOSIS — F33.42 RECURRENT MAJOR DEPRESSIVE DISORDER, IN FULL REMISSION (HCC): ICD-10-CM

## 2022-07-07 DIAGNOSIS — E03.9 ACQUIRED HYPOTHYROIDISM: ICD-10-CM

## 2022-07-07 DIAGNOSIS — E78.00 PURE HYPERCHOLESTEROLEMIA: ICD-10-CM

## 2022-07-07 DIAGNOSIS — I25.810 CORONARY ARTERY DISEASE INVOLVING CORONARY BYPASS GRAFT OF NATIVE HEART WITHOUT ANGINA PECTORIS: ICD-10-CM

## 2022-07-07 DIAGNOSIS — U07.1 COVID-19: Primary | ICD-10-CM

## 2022-07-07 LAB — SARS-COV-2 AG UPPER RESP QL IA: ABNORMAL

## 2022-07-07 PROCEDURE — 99214 OFFICE O/P EST MOD 30 MIN: CPT | Performed by: FAMILY MEDICINE

## 2022-07-07 PROCEDURE — 87811 SARS-COV-2 COVID19 W/OPTIC: CPT | Performed by: FAMILY MEDICINE

## 2022-07-07 NOTE — PROGRESS NOTES
COVID-19 Outpatient Progress Note    Assessment/Plan:    Problem List Items Addressed This Visit        Endocrine    Hypothyroidism     Levothyroxine as directed              Cardiovascular and Mediastinum    CAD (coronary artery disease) of artery bypass graft     ASA as directed              Other    Pure hypercholesterolemia     Atorvastatin as directed           Recurrent major depressive disorder, in full remission ([facility] [address] )     Stable on sertraline              Other Visit Diagnoses     COVID-19    -  Primary    Relevant Medications    nirmatrelvir & ritonavir (Paxlovid) tablet therapy pack    Other Relevant Orders    [facility] Covid home test flowsheet    Covid at Home Test Kit (Completed)         Disposition:     Patient has COVID-19 infection  Based off CDC guidelines, they were recommended to isolate for 5 days from the date of the positive test  If they remain asymptomatic, isolation may be ended followed by 5 days of wearing a mask when around othes to minimize risk of infecting others  If they have a fever, continue to stay home until fever resolves for at least 24 hours  I recommended continued isolation until at least 24 hours have passed since recovery defined as resolution of fever without the use of fever-reducing medications AND improvement in COVID symptoms AND 10 days have passed since onset of symptoms (or 10 days have passed since date of first positive viral diagnostic test for asymptomatic patients)  Discussed symptom directed medication options with patient  Discussed vitamin D, vitamin C, and/or zinc supplementation with patient  Patient meets criteria for PAXLOVID and they have been counseled appropriately according to EUA documentation released by the FDA  After discussion, patient agrees to treatment      [address] is an investigational medicine used to treat mild-to-moderate COVID-19 in adults and children (15years of age and older weighing at least 80 pounds (40 kg)) with positive results of direct SARS-CoV-2 viral testing, and who are at high risk for progression to severe COVID-19, including hospitalization or death  PAXLOVID is investigational because it is still being studied  There is limited information about the safety and effectiveness of using PAXLOVID to treat people with mild-to-moderate COVID-19  The FDA has authorized the emergency use of PAXLOVID for the treatment of mild-tomoderate COVID-19 in adults and children (15years of age and older weighing at least 80 pounds (40 kg)) with a positive test for the virus that causes COVID-19, and who are at high risk for progression to severe COVID-19, including hospitalization or death, under an EUA  What should I tell my healthcare provider before I take PAXLOVID? Tell your healthcare provider if you:  - Have any allergies  - Have liver or kidney disease  - Are pregnant or plan to become pregnant  - Are breastfeeding a child  - Have any serious illnesses    Tell your healthcare provider about all the medicines you take, including prescription and over-the-counter medicines, vitamins, and herbal supplements  Some medicines may interact with PAXLOVID and may cause serious side effects  Keep a list of your medicines to show your healthcare provider and pharmacist when you get a new medicine  You can ask your healthcare provider or pharmacist for a list of medicines that interact with PAXLOVID  Do not start taking a new medicine without telling your healthcare provider  Your healthcare provider can tell you if it is safe to take PAXLOVID with other medicines  Tell your healthcare provider if you are taking combined hormonal contraceptive  PAXLOVID may affect how your birth control pills work  Females who are able to become pregnant should use another effective alternative form of contraception or an additional barrier method of contraception   Talk to your healthcare provider if you have any questions about contraceptive methods that might be right for you  How do I take PAXLOVID? PAXLOVID consists of 2 medicines: nirmatrelvir and ritonavir  - Take 2 pink tablets of nirmatrelvir with 1 white tablet of ritonavir by mouth 2 times each day (in the morning and in the evening) for 5 days  For each dose, take all 3 tablets at the same time  - If you have kidney disease, talk to your healthcare provider  You may need a different dose  - Swallow the tablets whole  Do not chew, break, or crush the tablets  - Take PAXLOVID with or without food  - Do not stop taking PAXLOVID without talking to your healthcare provider, even if you feel better  - If you miss a dose of PAXLOVID within 8 hours of the time it is usually taken, take it as soon as you remember  If you miss a dose by more than 8 hours, skip the missed dose and take the next dose at your regular time  Do not take 2 doses of PAXLOVID at the same time  - If you take too much PAXLOVID, call your healthcare provider or go to the nearest hospital emergency room right away  - If you are taking a ritonavir- or cobicistat-containing medicine to treat hepatitis C or Human Immunodeficiency Virus (HIV), you should continue to take your medicine as prescribed by your healthcare provider   - Talk to your healthcare provider if you do not feel better or if you feel worse after 5 days  Who should generally not take PAXLOVID? Do not take PAXLOVID if:  You are allergic to nirmatrelvir, ritonavir, or any of the ingredients in PAXLOVID      You are taking any of the following medicines:  - Alfuzosin  - Pethidine, piroxicam, propoxyphene  - Ranolazine  - Amiodarone, dronedarone, flecainide, propafenone, quinidine  - Colchicine  - Lurasidone, pimozide, clozapine  - Dihydroergotamine, ergotamine, methylergonovine  - Lovastatin, simvastatin  - Sildenafil (Revatio®) for pulmonary arterial hypertension (PAH)  - Triazolam, oral midazolam  - Apalutamide  - Carbamazepine, phenobarbital, phenytoin  - Rifampin  - St  Jesus Albertos Wort (hypericum perforatum)    What are the important possible side effects of PAXLOVID? Possible side effects of PAXLOVID are:  - Liver Problems  Tell your healthcare provider right away if you have any of these signs and symptoms of liver problems: loss of appetite, yellowing of your skin and the whites of eyes (jaundice), dark-colored urine, pale colored stools and itchy skin, stomach area (abdominal) pain  - Resistance to HIV Medicines  If you have untreated HIV infection, PAXLOVID may lead to some HIV medicines not working as well in the future  - Other possible side effects include: altered sense of taste, diarrhea, high blood pressure, or muscle aches    These are not all the possible side effects of PAXLOVID  Not many people have taken PAXLOVID  Serious and unexpected side effects may happen  [address] is still being studied, so it is possible that all of the risks are not known at this time  What other treatment choices are there? Like Gillian Rodriguez may allow for the emergency use of other medicines to treat people with COVID-19  Go to https://[employer]iem[employer].com/ for information on the emergency use of other medicines that are authorized by FDA to treat people with COVID-19  Your healthcare provider may talk with you about clinical trials for which you may be eligible  It is your choice to be treated or not to be treated with PAXLOVID  Should you decide not to receive it or for your child not to receive it, it will not change your standard medical care  What if I am pregnant or breastfeeding? There is no experience treating pregnant women or breastfeeding mothers with PAXLOVID  For a mother and unborn baby, the benefit of taking PAXLOVID may be greater than the risk from the treatment   If you are pregnant, discuss your options and specific situation with your healthcare provider  It is recommended that you use effective barrier contraception or do not have sexual activity while taking PAXLOVID  If you are breastfeeding, discuss your options and specific situation with your healthcare provider  How do I report side effects with PAXLOVID? Contact your healthcare provider if you have any side effects that bother you or do not go away  Report side effects to FDA MedWatch at www fda gov/medwatch or call 0-426-YAV9196 or you can report side effects to [employer]  at the contact information provided below  Website Fax number Telephone number   [employer] [phone]     How should I store Audrea Fitch? Store PAXLOVID tablets at room temperature between 68°F to 77°F (20°C to 25°C)  Full fact sheet for patients, parents, and caregivers can be found at: iiko jose luis lewis    I have spent 15 minutes directly with the patient  Encounter provider Calvin Lockhart MD    Provider located at [address] [address]    Recent Visits  No visits were found meeting these conditions  Showing recent visits within past 7 days and meeting all other requirements  Today's Visits  Date Type Provider Dept   07/07/22 Telemedicine MD Arsenio Jimenez   Showing today's visits and meeting all other requirements  Future Appointments  No visits were found meeting these conditions  Showing future appointments within next 150 days and meeting all other requirements       Patient agrees to participate in a virtual check in via telephone or video visit instead of presenting to the office to address urgent/immediate medical needs  Patient is aware this is a billable service  After connecting through [facility], the patient was identified by name and date of birth   Del Trinhon was informed that this was a telemedicine visit and that the exam was being conducted confidentially over secure lines  Soco Smallwood acknowledged consent and understanding of privacy and security of the telemedicine visit  I informed the patient that I have reviewed his record in Epic and presented the opportunity for him to ask any questions regarding the visit today  The patient agreed to participate  Verification of patient location:  Patient is located in the following state in which I hold an active license: [place]    Subjective: Soco Smallwood is a 65 y o  male who has been screened for COVID-19  Symptom change since last report: worsening  Patient's symptoms include fatigue, nasal congestion, rhinorrhea, cough and myalgias  Patient denies fever, chills, malaise, sore throat, anosmia, loss of taste, shortness of breath, chest tightness, abdominal pain, nausea, vomiting, diarrhea and headaches  - Date of symptom onset: 7/5/2022  - Date of positive COVID-19 test: 7/7/2022  Type of test: Home antigen  Patient with typical symptoms of COVID-19 and they attest that they were positive on home rapid antigen testing  Image of positive result is not able to be uploaded into their chart  COVID-19 vaccination status: Fully vaccinated (primary series)    Arturo Thompson has been staying home and has isolated themselves in his home  He is taking care to not share personal items and is cleaning all surfaces that are touched often, like counters, tabletops, and doorknobs using household cleaning sprays or wipes  He is wearing a mask when he leaves his room       Lab Results   Component Value Date    SARSCOV2 Not Detected 09/18/2020    [facility] Positive (Patient Reported) (A) 07/07/2022     Past Medical History:   Diagnosis Date    Anxiety     CAD (coronary artery disease)     Colon polyp     Hyperlipidemia      Past Surgical History:   Procedure Laterality Date    COLONOSCOPY      CORONARY ARTERY BYPASS GRAFT      2011     Current Outpatient Medications   Medication Sig Dispense Refill    aspirin (ECOTRIN LOW STRENGTH) 81 mg EC tablet Take 81 mg by mouth daily      atorvastatin (LIPITOR) 80 mg tablet Take 1 tablet (80 mg total) by mouth daily 90 tablet 0    levothyroxine 75 mcg tablet Take 75 mcg by mouth daily in the early morning      nirmatrelvir & ritonavir (Paxlovid) tablet therapy pack Take 3 tablets by mouth 2 (two) times a day for 5 days Take 2 nirmatrelvir tablets + 1 ritonavir tablet together per dose 30 tablet 0    sertraline (ZOLOFT) 100 mg tablet Take 100 mg by mouth daily       No current facility-administered medications for this visit  No Known Allergies    Review of Systems   Constitutional: Positive for fatigue  Negative for chills and fever  HENT: Positive for congestion and rhinorrhea  Negative for sore throat  Respiratory: Positive for cough  Negative for chest tightness and shortness of breath  Gastrointestinal: Negative for abdominal pain, diarrhea, nausea and vomiting  Musculoskeletal: Positive for myalgias  Neurological: Negative for headaches  Objective: There were no vitals filed for this visit  Physical Exam  Vitals reviewed  Constitutional:       Appearance: Normal appearance  Pulmonary:      Effort: Pulmonary effort is normal  No respiratory distress  Neurological:      General: No focal deficit present  Mental Status: He is alert and oriented to person, place, and time  Psychiatric:         Mood and Affect: Mood normal          Behavior: Behavior normal          VIRTUAL VISIT DISCLAIMER    Andra Vazquez verbally agrees to participate in [employer]  Pt is aware that [employer] could be limited without vital signs or the ability to perform a full hands-on physical exam  Devendra Coyne understands he or the provider may request at any time to terminate the video visit and request the patient to seek care or treatment in person

## 2022-07-19 ENCOUNTER — APPOINTMENT (OUTPATIENT)
Dept: LAB | Facility: HOSPITAL | Age: 62
End: 2022-07-19
Payer: COMMERCIAL

## 2022-07-19 DIAGNOSIS — E78.00 PURE HYPERCHOLESTEROLEMIA: ICD-10-CM

## 2022-07-19 DIAGNOSIS — E03.9 ACQUIRED HYPOTHYROIDISM: ICD-10-CM

## 2022-07-19 DIAGNOSIS — Z00.00 ANNUAL PHYSICAL EXAM: ICD-10-CM

## 2022-07-19 LAB
ALBUMIN SERPL BCP-MCNC: 4 G/DL (ref 3.5–5)
ALP SERPL-CCNC: 49 U/L (ref 46–116)
ALT SERPL W P-5'-P-CCNC: 51 U/L (ref 12–78)
ANION GAP SERPL CALCULATED.3IONS-SCNC: 10 MMOL/L (ref 4–13)
AST SERPL W P-5'-P-CCNC: 25 U/L (ref 5–45)
BASOPHILS # BLD AUTO: 0.07 THOUSANDS/ΜL (ref 0–0.1)
BASOPHILS NFR BLD AUTO: 1 % (ref 0–1)
BILIRUB SERPL-MCNC: 0.65 MG/DL (ref 0.2–1)
BUN SERPL-MCNC: 17 MG/DL (ref 5–25)
CALCIUM SERPL-MCNC: 8.5 MG/DL (ref 8.3–10.1)
CHLORIDE SERPL-SCNC: 101 MMOL/L (ref 96–108)
CHOLEST SERPL-MCNC: 123 MG/DL
CO2 SERPL-SCNC: 26 MMOL/L (ref 21–32)
CREAT SERPL-MCNC: 1.03 MG/DL (ref 0.6–1.3)
EOSINOPHIL # BLD AUTO: 0.16 THOUSAND/ΜL (ref 0–0.61)
EOSINOPHIL NFR BLD AUTO: 3 % (ref 0–6)
ERYTHROCYTE [DISTWIDTH] IN BLOOD BY AUTOMATED COUNT: 11.3 % (ref 11.6–15.1)
GFR SERPL CREATININE-BSD FRML MDRD: 78 ML/MIN/1.73SQ M
GLUCOSE P FAST SERPL-MCNC: 116 MG/DL (ref 65–99)
HCT VFR BLD AUTO: 42.1 % (ref 36.5–49.3)
HDLC SERPL-MCNC: 30 MG/DL
HGB BLD-MCNC: 14.8 G/DL (ref 12–17)
IMM GRANULOCYTES # BLD AUTO: 0.01 THOUSAND/UL (ref 0–0.2)
IMM GRANULOCYTES NFR BLD AUTO: 0 % (ref 0–2)
LDLC SERPL CALC-MCNC: 35 MG/DL (ref 0–100)
LYMPHOCYTES # BLD AUTO: 1.97 THOUSANDS/ΜL (ref 0.6–4.47)
LYMPHOCYTES NFR BLD AUTO: 36 % (ref 14–44)
MCH RBC QN AUTO: 31.8 PG (ref 26.8–34.3)
MCHC RBC AUTO-ENTMCNC: 35.2 G/DL (ref 31.4–37.4)
MCV RBC AUTO: 91 FL (ref 82–98)
MONOCYTES # BLD AUTO: 0.48 THOUSAND/ΜL (ref 0.17–1.22)
MONOCYTES NFR BLD AUTO: 9 % (ref 4–12)
NEUTROPHILS # BLD AUTO: 2.84 THOUSANDS/ΜL (ref 1.85–7.62)
NEUTS SEG NFR BLD AUTO: 51 % (ref 43–75)
NRBC BLD AUTO-RTO: 0 /100 WBCS
PLATELET # BLD AUTO: 173 THOUSANDS/UL (ref 149–390)
PMV BLD AUTO: 9.7 FL (ref 8.9–12.7)
POTASSIUM SERPL-SCNC: 4.3 MMOL/L (ref 3.5–5.3)
PROT SERPL-MCNC: 7.6 G/DL (ref 6.4–8.4)
PSA SERPL-MCNC: 0.6 NG/ML (ref 0–4)
RBC # BLD AUTO: 4.65 MILLION/UL (ref 3.88–5.62)
SODIUM SERPL-SCNC: 137 MMOL/L (ref 135–147)
TRIGL SERPL-MCNC: 291 MG/DL
TSH SERPL DL<=0.05 MIU/L-ACNC: 3.21 UIU/ML (ref 0.45–4.5)
WBC # BLD AUTO: 5.53 THOUSAND/UL (ref 4.31–10.16)

## 2022-07-19 PROCEDURE — 84153 ASSAY OF PSA TOTAL: CPT

## 2022-07-19 PROCEDURE — 84443 ASSAY THYROID STIM HORMONE: CPT

## 2022-07-19 PROCEDURE — 36415 COLL VENOUS BLD VENIPUNCTURE: CPT | Performed by: FAMILY MEDICINE

## 2022-07-19 PROCEDURE — 87389 HIV-1 AG W/HIV-1&-2 AB AG IA: CPT

## 2022-07-19 PROCEDURE — 80061 LIPID PANEL: CPT

## 2022-07-19 PROCEDURE — 85025 COMPLETE CBC W/AUTO DIFF WBC: CPT | Performed by: FAMILY MEDICINE

## 2022-07-19 PROCEDURE — 80053 COMPREHEN METABOLIC PANEL: CPT | Performed by: FAMILY MEDICINE

## 2022-07-20 LAB — HIV 1+2 AB+HIV1 P24 AG SERPL QL IA: NORMAL

## 2022-08-30 ENCOUNTER — TELEPHONE (OUTPATIENT)
Dept: GASTROENTEROLOGY | Facility: CLINIC | Age: 62
End: 2022-08-30

## 2022-08-30 NOTE — TELEPHONE ENCOUNTER
Scheduled date of colon (as of today) 10/25/2022  Physician performing: Melissa Aguayo  Location of procedure:   [place]  Bowel prep reviewed with patient: Miralax Dulcolax  Instructions reviewed with patient by: Paz Eldridge  Clearances: N/A    Emailed and mailed to patient

## 2022-08-30 NOTE — TELEPHONE ENCOUNTER
08/30/22  Screened by: Jillian Cotton    Referring Provider: Anika Rivera MD    Pre- Screening: There is no height or weight on file to calculate BMI  29 04  Has patient been referred for a routine screening Colonoscopy? yes  Is the patient between 39-70 years old? yes      Previous Colonoscopy yes   If yes:    Date: 2020    Facility: [facility]    Reason: screening      SCHEDULING STAFF: If the patient is between 45yrs-49yrs, please advise patient to confirm benefits/coverage with their insurance company for a routine screening colonoscopy, some insurance carriers will only cover at [address] or older  If the patient is over 66years old, please schedule an office visit  Does the patient want to see a Gastroenterologist prior to their procedure OR are they having any GI symptoms? no    Has the patient been hospitalized or had abdominal surgery in the past 6 months? no    Does the patient use supplemental oxygen? no    Does the patient take Coumadin, Lovenox, Plavix, Elliquis, Xarelto, or other blood thinning medication? no    Has the patient had a stroke, cardiac event, or stent placed in the past year? no    SCHEDULING STAFF: If patient answers NO to above questions, then schedule procedure  If patient answers YES to above questions, then schedule office appointment  If patient is between 45yrs - 49yrs, please advise patient that we will have to confirm benefits & coverage with their insurance company for a routine screening colonoscopy

## 2022-08-30 NOTE — TELEPHONE ENCOUNTER
Patients GI provider:  Dr Spaulding Marker    Number to return call: [phone]    Reason for call: Pt calling to schedule procedure      Scheduled procedure/appointment date if applicable: N/A

## 2022-10-24 RX ORDER — SODIUM CHLORIDE 9 MG/ML
125 INJECTION, SOLUTION INTRAVENOUS CONTINUOUS
Status: CANCELLED | OUTPATIENT
Start: 2022-10-24

## 2022-10-25 ENCOUNTER — HOSPITAL ENCOUNTER (OUTPATIENT)
Dept: GASTROENTEROLOGY | Facility: MEDICAL CENTER | Age: 62
Setting detail: OUTPATIENT SURGERY
Discharge: HOME | End: 2022-10-25
Payer: COMMERCIAL

## 2022-10-25 ENCOUNTER — TELEPHONE (OUTPATIENT)
Dept: GASTROENTEROLOGY | Facility: CLINIC | Age: 62
End: 2022-10-25

## 2022-10-25 ENCOUNTER — ANESTHESIA EVENT (OUTPATIENT)
Dept: GASTROENTEROLOGY | Facility: MEDICAL CENTER | Age: 62
End: 2022-10-25

## 2022-10-25 ENCOUNTER — ANESTHESIA (OUTPATIENT)
Dept: GASTROENTEROLOGY | Facility: MEDICAL CENTER | Age: 62
End: 2022-10-25

## 2022-10-25 VITALS
DIASTOLIC BLOOD PRESSURE: 71 MMHG | SYSTOLIC BLOOD PRESSURE: 117 MMHG | HEIGHT: 69 IN | TEMPERATURE: 96.4 F | BODY MASS INDEX: 28.14 KG/M2 | HEART RATE: 73 BPM | OXYGEN SATURATION: 97 % | WEIGHT: 190 LBS | RESPIRATION RATE: 16 BRPM

## 2022-10-25 DIAGNOSIS — Z12.11 SCREENING FOR COLON CANCER: ICD-10-CM

## 2022-10-25 RX ORDER — PROPOFOL 10 MG/ML
INJECTION, EMULSION INTRAVENOUS AS NEEDED
Status: DISCONTINUED | OUTPATIENT
Start: 2022-10-25 | End: 2022-10-25

## 2022-10-25 RX ORDER — PROPOFOL 10 MG/ML
INJECTION, EMULSION INTRAVENOUS CONTINUOUS PRN
Status: DISCONTINUED | OUTPATIENT
Start: 2022-10-25 | End: 2022-10-25

## 2022-10-25 RX ORDER — SODIUM CHLORIDE 9 MG/ML
125 INJECTION, SOLUTION INTRAVENOUS CONTINUOUS
Status: DISCONTINUED | OUTPATIENT
Start: 2022-10-25 | End: 2022-10-29 | Stop reason: HOSPADM

## 2022-10-25 RX ADMIN — PROPOFOL 160 MCG/KG/MIN: 10 INJECTION, EMULSION INTRAVENOUS at 10:21

## 2022-10-25 RX ADMIN — PROPOFOL 60 MG: 10 INJECTION, EMULSION INTRAVENOUS at 10:18

## 2022-10-25 RX ADMIN — SODIUM CHLORIDE 125 ML/HR: 0.9 INJECTION, SOLUTION INTRAVENOUS at 10:11

## 2022-10-25 NOTE — TELEPHONE ENCOUNTER
----- Message from Debby Crawford MD sent at 10/25/2022 11:13 AM EDT -----  Please schedule a repeat colonoscopy in 6 months for surveillance after EMR of colon polyp, needs to be in [facility]   Thanks

## 2022-10-25 NOTE — ANESTHESIA PREPROCEDURE EVALUATION
Procedure:  COLONOSCOPY    Relevant Problems   ANESTHESIA (within normal limits)      CARDIO   (+) CAD (coronary artery disease) of artery bypass graft   (+) Pure hypercholesterolemia      ENDO   (+) Hypothyroidism      NEURO/PSYCH   (+) Recurrent major depressive disorder, in full remission [facility])        Physical Exam    Airway    Mallampati score: II    Neck ROM: full     Dental   No notable dental hx     Cardiovascular  Rhythm: regular, Rate: normal, Cardiovascular exam normal    Pulmonary  Pulmonary exam normal Breath sounds clear to auscultation,     Other Findings        Anesthesia Plan  ASA Score- 3     Anesthesia Type- IV sedation with anesthesia with ASA Monitors  Additional Monitors:   Airway Plan:     Comment: LEFT VENTRICLE: Normal left ventricular chamber dimension  Borderline left ventricular hypertrophy  Visually estimated left ventricular ejection fraction is about 55-60%  Regional wall motion cannot be evaluated on this study because of poor visualization  Normal diastolic filling pattern     RIGHT VENTRICLE: Normal right ventricular size and systolic function  TAPSE is 1 9 cm     LEFT ATRIUM: Left atrial size is at upper limit of normal     ATRIAL SEPTUM: A small PFO/ASD cannot be excluded on this study     RIGHT ATRIUM: Right atrial size is at upper limit of normal     MITRAL VALVE: Mildly thickened mitral valve leaflets  There is trace mitral regurgitation  No mitral stenosis seen     AORTIC VALVE: Aortic valve is tricuspid and appears morphologically normal   There is no significant aortic stenosis regurgitation seen    Plan Factors-Exercise tolerance (METS): >4 METS  Chart reviewed  EKG reviewed  Existing labs reviewed  Patient summary reviewed  Patient is not a current smoker  Induction- intravenous  Postoperative Plan-     Informed Consent- Anesthetic plan and risks discussed with patient

## 2022-10-25 NOTE — ANESTHESIA POSTPROCEDURE EVALUATION
Post-Op Assessment Note    CV Status:  Stable    Pain management: adequate     Mental Status:  Alert and awake   Hydration Status:  Euvolemic   PONV Controlled:  Controlled   Airway Patency:  Patent   Two or more mitigation strategies used for obstructive sleep apnea   Post Op Vitals Reviewed: Yes      Staff: Anesthesiologist         No complications documented      BP      Temp     Pulse     Resp      SpO2      /71   Pulse 73   Temp (!) 96 4 °F (35 8 °C) (Temporal)   Resp 16   Ht 5' 9" (1 753 m)   Wt 86 2 kg (190 lb)   SpO2 97%   BMI 28 06 kg/m²

## 2022-10-25 NOTE — H&P
History and Physical -  [facility]  Ian Andre 65 y o  male MRN: [id number]                  HPI: Ian Andre is a 65y o  year old male who presents for colonoscopy due to h/o colon polyp s/p EMR      REVIEW OF SYSTEMS: Per the HPI, and otherwise unremarkable  Historical Information   Past Medical History:   Diagnosis Date   • Anxiety    • CAD (coronary artery disease)    • Colon polyp    • Hyperlipidemia      Past Surgical History:   Procedure Laterality Date   • COLONOSCOPY     • CORONARY ARTERY BYPASS GRAFT      2011     Social History   Social History     Substance and Sexual Activity   Alcohol Use Never     Social History     Substance and Sexual Activity   Drug Use Never     Social History     Tobacco Use   Smoking Status Former Smoker   • Packs/day: 1 00   • Years: 12 00   • Pack years: 12 00   Smokeless Tobacco Never Used     Family History   Problem Relation Age of Onset   • Diabetes Mother    • Lung cancer Mother    • No Known Problems Father        Meds/Allergies     (Not in a hospital admission)      No Known Allergies    Objective     There were no vitals taken for this visit        PHYSICAL EXAM    Gen: NAD  CV: RRR  CHEST: Clear  ABD: soft, NT/ND  EXT: no edema      ASSESSMENT/PLAN:  This is a 65y o  year old male here for colonoscopy

## 2022-12-12 DIAGNOSIS — E78.00 PURE HYPERCHOLESTEROLEMIA: ICD-10-CM

## 2022-12-12 RX ORDER — ATORVASTATIN CALCIUM 80 MG/1
TABLET, FILM COATED ORAL
Qty: 90 TABLET | Refills: 0 | Status: SHIPPED | OUTPATIENT
Start: 2022-12-12

## 2023-03-17 DIAGNOSIS — E78.00 PURE HYPERCHOLESTEROLEMIA: ICD-10-CM

## 2023-03-17 RX ORDER — ATORVASTATIN CALCIUM 80 MG/1
TABLET, FILM COATED ORAL
Qty: 90 TABLET | Refills: 0 | Status: SHIPPED | OUTPATIENT
Start: 2023-03-17

## 2023-06-16 DIAGNOSIS — E78.00 PURE HYPERCHOLESTEROLEMIA: ICD-10-CM

## 2023-06-16 RX ORDER — ATORVASTATIN CALCIUM 80 MG/1
TABLET, FILM COATED ORAL
Qty: 90 TABLET | Refills: 0 | Status: SHIPPED | OUTPATIENT
Start: 2023-06-16

## 2023-07-20 NOTE — H&P
H&P EXAM - Outpatient Endoscopy   Rafa Kaur 63 y o  male MRN: [id number]    [address]   Encounter: [id number]        History and Physical -  [facility]  Rafa Kaur 63 y o  male MRN: [id number]                  HPI: Rafa Kaur is a 63y o  year old male who presents for colonoscopy      REVIEW OF SYSTEMS: Per the HPI, and otherwise unremarkable  Historical Information   Past Medical History:   Diagnosis Date    Anxiety     CAD (coronary artery disease)     Colon polyp     Hyperlipidemia      Past Surgical History:   Procedure Laterality Date    COLONOSCOPY      CORONARY ARTERY BYPASS GRAFT      2011     Social History   Social History     Substance and Sexual Activity   Alcohol Use Never    Frequency: Never     Social History     Substance and Sexual Activity   Drug Use Never     Social History     Tobacco Use   Smoking Status Former Smoker    Packs/day: 1 00    Years: 12 00    Pack years: 12 00   Smokeless Tobacco Never Used     Family History   Problem Relation Age of Onset    Diabetes Mother     Lung cancer Mother     No Known Problems Father        Meds/Allergies       (Not in a hospital admission)    No Known Allergies    Objective     /86   Pulse 65   Temp 97 5 °F (36 4 °C) (Temporal)   Resp 17   Ht 5' 9" (1 753 m)   Wt 89 4 kg (197 lb)   SpO2 96%   BMI 29 09 kg/m²       PHYSICAL EXAM    Gen: NAD  CV: RRR  CHEST: Clear  ABD: soft, NT/ND  EXT: no edema      ASSESSMENT/PLAN:  This is a 63y o  year old male here for colonoscopy, and he is stable and optimized for his procedure 
Abdominal pain

## 2023-09-15 DIAGNOSIS — E78.00 PURE HYPERCHOLESTEROLEMIA: ICD-10-CM

## 2023-09-15 NOTE — TELEPHONE ENCOUNTER
Requested medication(s) are due for refill today: Yes  Patient has already received a courtesy refill: Yes  Other reason request has been forwarded to provider: Courtesy refill previously given. Please review.   Pt also needs appt

## 2023-09-17 RX ORDER — ATORVASTATIN CALCIUM 80 MG/1
TABLET, FILM COATED ORAL
Qty: 90 TABLET | Refills: 0 | Status: SHIPPED | OUTPATIENT
Start: 2023-09-17

## 2023-12-14 DIAGNOSIS — E78.00 PURE HYPERCHOLESTEROLEMIA: ICD-10-CM

## 2023-12-18 RX ORDER — ATORVASTATIN CALCIUM 80 MG/1
TABLET, FILM COATED ORAL
Qty: 90 TABLET | Refills: 0 | Status: SHIPPED | OUTPATIENT
Start: 2023-12-18

## 2023-12-18 NOTE — TELEPHONE ENCOUNTER
Requested medication(s) are due for refill today: Yes  Patient has already received a courtesy refill: No  Other reason request has been forwarded to provider: per protocol

## 2024-01-24 ENCOUNTER — OFFICE VISIT (OUTPATIENT)
Dept: FAMILY MEDICINE CLINIC | Facility: CLINIC | Age: 64
End: 2024-01-24
Payer: COMMERCIAL

## 2024-01-24 VITALS
WEIGHT: 201.6 LBS | DIASTOLIC BLOOD PRESSURE: 78 MMHG | BODY MASS INDEX: 29.86 KG/M2 | TEMPERATURE: 98.4 F | OXYGEN SATURATION: 98 % | HEIGHT: 69 IN | RESPIRATION RATE: 17 BRPM | HEART RATE: 77 BPM | SYSTOLIC BLOOD PRESSURE: 126 MMHG

## 2024-01-24 DIAGNOSIS — F33.42 RECURRENT MAJOR DEPRESSIVE DISORDER, IN FULL REMISSION (HCC): ICD-10-CM

## 2024-01-24 DIAGNOSIS — Z12.83 SKIN CANCER SCREENING: ICD-10-CM

## 2024-01-24 DIAGNOSIS — Z12.11 COLON CANCER SCREENING: ICD-10-CM

## 2024-01-24 DIAGNOSIS — E78.00 PURE HYPERCHOLESTEROLEMIA: ICD-10-CM

## 2024-01-24 DIAGNOSIS — Z12.5 PROSTATE CANCER SCREENING: ICD-10-CM

## 2024-01-24 DIAGNOSIS — E03.9 ACQUIRED HYPOTHYROIDISM: ICD-10-CM

## 2024-01-24 DIAGNOSIS — Z00.00 ANNUAL PHYSICAL EXAM: Primary | ICD-10-CM

## 2024-01-24 PROCEDURE — 99214 OFFICE O/P EST MOD 30 MIN: CPT | Performed by: FAMILY MEDICINE

## 2024-01-24 PROCEDURE — 99396 PREV VISIT EST AGE 40-64: CPT | Performed by: FAMILY MEDICINE

## 2024-01-24 RX ORDER — LEVOTHYROXINE SODIUM 0.07 MG/1
75 TABLET ORAL
Qty: 90 TABLET | Refills: 0 | Status: SHIPPED | OUTPATIENT
Start: 2024-01-24

## 2024-01-24 RX ORDER — SERTRALINE HYDROCHLORIDE 100 MG/1
100 TABLET, FILM COATED ORAL DAILY
Qty: 90 TABLET | Refills: 3 | Status: SHIPPED | OUTPATIENT
Start: 2024-01-24

## 2024-01-24 RX ORDER — ATORVASTATIN CALCIUM 80 MG/1
80 TABLET, FILM COATED ORAL DAILY
Qty: 90 TABLET | Refills: 3 | Status: SHIPPED | OUTPATIENT
Start: 2024-01-24

## 2024-01-24 NOTE — PROGRESS NOTES
ADULT ANNUAL PHYSICAL  [facility]    NAME: Devendra Coyne  AGE: 63 y.o. SEX: male  : 1960     DATE: 2024     Assessment and Plan:     Problem List Items Addressed This Visit          Endocrine    Hypothyroidism     Stable on levothyroxine         Relevant Medications    levothyroxine 75 mcg tablet    Other Relevant Orders    TSH, 3rd generation with Free T4 reflex       Other    Pure hypercholesterolemia     On lipitor         Relevant Medications    atorvastatin (LIPITOR) 80 mg tablet    Recurrent major depressive disorder, in full remission (HCC)     Stable on sertraline          Relevant Medications    sertraline (ZOLOFT) 100 mg tablet     Other Visit Diagnoses       Annual physical exam    -  Primary    Relevant Orders    CBC and differential    Comprehensive metabolic panel    Lipid panel    Prostate cancer screening        Relevant Orders    PSA, Total Screen    Skin cancer screening        Relevant Orders    Ambulatory Referral to Dermatology    Colon cancer screening        Relevant Orders    Ambulatory Referral to Gastroenterology            Immunizations and preventive care screenings were discussed with patient today. Appropriate education was printed on patient's after visit summary.    Discussed risks and benefits of prostate cancer screening. We discussed the controversial history of PSA screening for prostate cancer in the [place] as well as the risk of over detection and over treatment of prostate cancer by way of PSA screening.  The patient understands that PSA blood testing is an imperfect way to screen for prostate cancer and that elevated PSA levels in the blood may also be caused by infection, inflammation, prostatic trauma or manipulation, urological procedures, or by benign prostatic enlargement.    The role of the digital rectal examination in prostate cancer screening was also discussed and I discussed with him that  there is large interobserver variability in the findings of digital rectal examination.    Counseling:  Alcohol/drug use: discussed moderation in alcohol intake, the recommendations for healthy alcohol use, and avoidance of illicit drug use.  Dental Health: discussed importance of regular tooth brushing, flossing, and dental visits.  Injury prevention: discussed safety/seat belts, safety helmets, smoke detectors, carbon dioxide detectors, and smoking near bedding or upholstery.  Sexual health: discussed sexually transmitted diseases, partner selection, use of condoms, avoidance of unintended pregnancy, and contraceptive alternatives.  Exercise: the importance of regular exercise/physical activity was discussed. Recommend exercise 3-5 times per week for at least 30 minutes.       Depression Screening and Follow-up Plan: Patient was screened for depression during today's encounter. They screened negative with a PHQ-9 score of 0.        No follow-ups on file.     Chief Complaint:     Chief Complaint   Patient presents with    Physical Exam     Patient being seen for Physical Exam      History of Present Illness:     Adult Annual Physical   Patient here for a comprehensive physical exam. The patient reports no problems.    Diet and Physical Activity  Diet/Nutrition: well balanced diet and consuming 3-5 servings of fruits/vegetables daily.   Exercise: moderate cardiovascular exercise.      Depression Screening  PHQ-2/9 Depression Screening    Little interest or pleasure in doing things: 0 - not at all  Feeling down, depressed, or hopeless: 0 - not at all  Trouble falling or staying asleep, or sleeping too much: 0 - not at all  Feeling tired or having little energy: 0 - not at all  Poor appetite or overeatin - not at all  Feeling bad about yourself - or that you are a failure or have let yourself or your family down: 0 - not at all  Trouble concentrating on things, such as reading the newspaper or watching television:  0 - not at all  Moving or speaking so slowly that other people could have noticed. Or the opposite - being so fidgety or restless that you have been moving around a lot more than usual: 0 - not at all  Thoughts that you would be better off dead, or of hurting yourself in some way: 0 - not at all  PHQ-9 Score: 0  PHQ-9 Interpretation: No or Minimal depression       General Health  Sleep: sleeps well.   Hearing: normal - bilateral.  Vision: goes for regular eye exams.   Dental: regular dental visits.        Health  Symptoms include: none    Advanced Care Planning  Do you have an advanced directive? no  Do you have a durable medical power of ? no     Review of Systems:     Review of Systems   Constitutional: Negative.    HENT: Negative.     Eyes: Negative.    Respiratory: Negative.     Cardiovascular: Negative.    Gastrointestinal: Negative.    Endocrine: Negative.    Genitourinary: Negative.    Musculoskeletal: Negative.    Skin: Negative.    Allergic/Immunologic: Negative.    Neurological: Negative.    Hematological: Negative.    Psychiatric/Behavioral: Negative.        Past Medical History:     Past Medical History:   Diagnosis Date    Anxiety     CAD (coronary artery disease)     Colon polyp     Hyperlipidemia       Past Surgical History:     Past Surgical History:   Procedure Laterality Date    COLONOSCOPY      CORONARY ARTERY BYPASS GRAFT            Family History:     Family History   Problem Relation Age of Onset    Diabetes Mother     Lung cancer Mother     Cancer Mother         Mother  of lung cancer    No Known Problems Father       Social History:     Social History     Socioeconomic History    Marital status: Unknown     Spouse name: None    Number of children: None    Years of education: None    Highest education level: None   Occupational History    None   Tobacco Use    Smoking status: Former     Current packs/day: 1.00     Average packs/day: 1 pack/day for 12.0 years (12.0 ttl pk-yrs)  "    Types: Cigarettes    Smokeless tobacco: Never   Vaping Use    Vaping status: Never Used   Substance and Sexual Activity    Alcohol use: Never    Drug use: Never    Sexual activity: Yes     Partners: Female   Other Topics Concern    None   Social History Narrative    None     Social Determinants of Health     Financial Resource Strain: Not on file   Food Insecurity: Not on file   Transportation Needs: Not on file   Physical Activity: Not on file   Stress: Not on file   Social Connections: Not on file   Intimate Partner Violence: Not on file   Housing Stability: Not on file      Current Medications:     Current Outpatient Medications   Medication Sig Dispense Refill    aspirin (ECOTRIN LOW STRENGTH) 81 mg EC tablet Take 81 mg by mouth daily      atorvastatin (LIPITOR) 80 mg tablet Take 1 tablet (80 mg total) by mouth daily 90 tablet 3    levothyroxine 75 mcg tablet Take 1 tablet (75 mcg total) by mouth daily in the early morning 90 tablet 0    sertraline (ZOLOFT) 100 mg tablet Take 1 tablet (100 mg total) by mouth daily 90 tablet 3     No current facility-administered medications for this visit.      Allergies:     No Known Allergies   Physical Exam:     /78 (BP Location: Left arm, Patient Position: Sitting, Cuff Size: Standard)   Pulse 77   Temp 98.4 °F (36.9 °C) (Tympanic)   Resp 17   Ht 5' 9\" (1.753 m)   Wt 91.4 kg (201 lb 9.6 oz)   SpO2 98%   BMI 29.77 kg/m²     Physical Exam  Vitals and nursing note reviewed.   Constitutional:       Appearance: Normal appearance. He is well-developed.   HENT:      Head: Normocephalic and atraumatic.      Right Ear: Tympanic membrane normal.      Left Ear: Tympanic membrane normal.      Nose: Nose normal.      Mouth/Throat:      Mouth: Mucous membranes are moist.   Eyes:      Extraocular Movements: Extraocular movements intact.      Pupils: Pupils are equal, round, and reactive to light.   Cardiovascular:      Rate and Rhythm: Normal rate and regular rhythm.      " Pulses: Normal pulses.      Heart sounds: Normal heart sounds.   Pulmonary:      Effort: Pulmonary effort is normal.      Breath sounds: Normal breath sounds.   Abdominal:      Palpations: Abdomen is soft.   Musculoskeletal:         General: Normal range of motion.      Cervical back: Normal range of motion and neck supple.   Skin:     General: Skin is warm.      Capillary Refill: Capillary refill takes less than 2 seconds.   Neurological:      General: No focal deficit present.      Mental Status: He is alert and oriented to person, place, and time.   Psychiatric:         Mood and Affect: Mood normal.         Behavior: Behavior normal.          MD ONEAL Solomon [facility]

## 2024-02-07 LAB
ALBUMIN SERPL-MCNC: 4.8 G/DL (ref 3.9–4.9)
ALBUMIN/GLOB SERPL: 1.8 {RATIO} (ref 1.2–2.2)
ALP SERPL-CCNC: 49 IU/L (ref 44–121)
ALT SERPL-CCNC: 39 IU/L (ref 0–44)
AST SERPL-CCNC: 32 IU/L (ref 0–40)
BASOPHILS # BLD AUTO: 0.1 X10E3/UL (ref 0–0.2)
BASOPHILS NFR BLD AUTO: 1 %
BILIRUB SERPL-MCNC: 0.5 MG/DL (ref 0–1.2)
BUN SERPL-MCNC: 18 MG/DL (ref 8–27)
BUN/CREAT SERPL: 16 (ref 10–24)
CALCIUM SERPL-MCNC: 9.4 MG/DL (ref 8.6–10.2)
CHLORIDE SERPL-SCNC: 100 MMOL/L (ref 96–106)
CHOLEST SERPL-MCNC: 140 MG/DL (ref 100–199)
CO2 SERPL-SCNC: 23 MMOL/L (ref 20–29)
CREAT SERPL-MCNC: 1.13 MG/DL (ref 0.76–1.27)
EGFR: 73 ML/MIN/1.73
EOSINOPHIL # BLD AUTO: 0.3 X10E3/UL (ref 0–0.4)
EOSINOPHIL NFR BLD AUTO: 5 %
ERYTHROCYTE [DISTWIDTH] IN BLOOD BY AUTOMATED COUNT: 12.2 % (ref 11.6–15.4)
GLOBULIN SER-MCNC: 2.7 G/DL (ref 1.5–4.5)
GLUCOSE SERPL-MCNC: 114 MG/DL (ref 70–99)
HCT VFR BLD AUTO: 47 % (ref 37.5–51)
HDLC SERPL-MCNC: 31 MG/DL
HGB BLD-MCNC: 16.4 G/DL (ref 13–17.7)
IMM GRANULOCYTES # BLD: 0 X10E3/UL (ref 0–0.1)
IMM GRANULOCYTES NFR BLD: 0 %
LDLC SERPL CALC-MCNC: 62 MG/DL (ref 0–99)
LYMPHOCYTES # BLD AUTO: 1.8 X10E3/UL (ref 0.7–3.1)
LYMPHOCYTES NFR BLD AUTO: 30 %
MCH RBC QN AUTO: 32.2 PG (ref 26.6–33)
MCHC RBC AUTO-ENTMCNC: 34.9 G/DL (ref 31.5–35.7)
MCV RBC AUTO: 92 FL (ref 79–97)
MONOCYTES # BLD AUTO: 0.4 X10E3/UL (ref 0.1–0.9)
MONOCYTES NFR BLD AUTO: 7 %
NEUTROPHILS # BLD AUTO: 3.5 X10E3/UL (ref 1.4–7)
NEUTROPHILS NFR BLD AUTO: 57 %
PLATELET # BLD AUTO: 148 X10E3/UL (ref 150–450)
POTASSIUM SERPL-SCNC: 5.2 MMOL/L (ref 3.5–5.2)
PROT SERPL-MCNC: 7.5 G/DL (ref 6–8.5)
PSA SERPL-MCNC: 0.6 NG/ML (ref 0–4)
RBC # BLD AUTO: 5.09 X10E6/UL (ref 4.14–5.8)
SL AMB VLDL CHOLESTEROL CALC: 47 MG/DL (ref 5–40)
SODIUM SERPL-SCNC: 138 MMOL/L (ref 134–144)
TRIGL SERPL-MCNC: 293 MG/DL (ref 0–149)
TSH SERPL DL<=0.005 MIU/L-ACNC: 3.06 UIU/ML (ref 0.45–4.5)
WBC # BLD AUTO: 6.1 X10E3/UL (ref 3.4–10.8)

## 2024-02-12 DIAGNOSIS — D69.6 THROMBOCYTOPENIA (HCC): Primary | ICD-10-CM

## 2024-02-14 ENCOUNTER — TELEPHONE (OUTPATIENT)
Dept: FAMILY MEDICINE CLINIC | Facility: CLINIC | Age: 64
End: 2024-02-14

## 2024-02-14 NOTE — TELEPHONE ENCOUNTER
----- Message from Yessenia Ortiz MD sent at 2/12/2024  7:24 AM EST -----  Please let patient know that his labs are stable overall other than slightly low platelet count which are cells that helps to stop bleeding.  I would like to recheck the level again in next 2-3 weeks  Ordered in chart  High triglycerides level- watch sweets and refined sugar    Dr ortiz

## 2024-02-15 NOTE — TELEPHONE ENCOUNTER
Patient called in and RN reviewed provider's comment for lab results. Patient will get repeat lab done between 2/20-2/27. RN faxed lab order to [facility].

## 2024-02-24 LAB
BASOPHILS # BLD AUTO: 0.1 X10E3/UL (ref 0–0.2)
BASOPHILS NFR BLD AUTO: 1 %
EOSINOPHIL # BLD AUTO: 0.1 X10E3/UL (ref 0–0.4)
EOSINOPHIL NFR BLD AUTO: 2 %
ERYTHROCYTE [DISTWIDTH] IN BLOOD BY AUTOMATED COUNT: 11.9 % (ref 11.6–15.4)
HCT VFR BLD AUTO: 47 % (ref 37.5–51)
HGB BLD-MCNC: 16.5 G/DL (ref 13–17.7)
IMM GRANULOCYTES # BLD: 0 X10E3/UL (ref 0–0.1)
IMM GRANULOCYTES NFR BLD: 0 %
LYMPHOCYTES # BLD AUTO: 2 X10E3/UL (ref 0.7–3.1)
LYMPHOCYTES NFR BLD AUTO: 24 %
MCH RBC QN AUTO: 32.7 PG (ref 26.6–33)
MCHC RBC AUTO-ENTMCNC: 35.1 G/DL (ref 31.5–35.7)
MCV RBC AUTO: 93 FL (ref 79–97)
MONOCYTES # BLD AUTO: 0.5 X10E3/UL (ref 0.1–0.9)
MONOCYTES NFR BLD AUTO: 6 %
NEUTROPHILS # BLD AUTO: 5.6 X10E3/UL (ref 1.4–7)
NEUTROPHILS NFR BLD AUTO: 67 %
PLATELET # BLD AUTO: 160 X10E3/UL (ref 150–450)
RBC # BLD AUTO: 5.04 X10E6/UL (ref 4.14–5.8)
WBC # BLD AUTO: 8.2 X10E3/UL (ref 3.4–10.8)

## 2024-04-16 ENCOUNTER — OFFICE VISIT (OUTPATIENT)
Dept: GASTROENTEROLOGY | Facility: CLINIC | Age: 64
End: 2024-04-16
Payer: COMMERCIAL

## 2024-04-16 VITALS
SYSTOLIC BLOOD PRESSURE: 130 MMHG | HEART RATE: 70 BPM | WEIGHT: 201.6 LBS | BODY MASS INDEX: 29.86 KG/M2 | HEIGHT: 69 IN | DIASTOLIC BLOOD PRESSURE: 87 MMHG

## 2024-04-16 DIAGNOSIS — Z12.11 COLON CANCER SCREENING: ICD-10-CM

## 2024-04-16 DIAGNOSIS — Z86.010 HISTORY OF COLON POLYPS: Primary | ICD-10-CM

## 2024-04-16 PROBLEM — Z86.0100 HISTORY OF COLON POLYPS: Status: ACTIVE | Noted: 2024-04-16

## 2024-04-16 PROCEDURE — 99214 OFFICE O/P EST MOD 30 MIN: CPT | Performed by: INTERNAL MEDICINE

## 2024-04-16 RX ORDER — SODIUM PICOSULFATE, MAGNESIUM OXIDE, AND ANHYDROUS CITRIC ACID 12; 3.5; 1 G/175ML; G/175ML; MG/175ML
LIQUID ORAL
Qty: 350 ML | Refills: 0 | Status: SHIPPED | OUTPATIENT
Start: 2024-04-16

## 2024-04-16 NOTE — PATIENT INSTRUCTIONS
Scheduled date of colonoscopy (as of today): 6/25/24  Physician performing colonoscopy: Isauro  Location of colonoscopy: [facility]  Bowel prep reviewed with patient: Clenpiq  Instructions reviewed with patient by: Radha CLARK  Clearances: n/a

## 2024-04-16 NOTE — ASSESSMENT & PLAN NOTE
Personal history of colon polyps- patient is at increased risk for colon cancer screening.  Rule out colorectal lesions including polyps or malignancy.    -Schedule for colonoscopy.    -High-fiber diet.    -Patient was given instructions about the colonoscopy prep.    -Patient was explained about the risks and benefits of the procedure. Risks including but not limited to bleeding, infection, perforation were explained in detail. Also explained about less than 100% sensitivity with the exam and other alternatives.

## 2024-04-16 NOTE — PROGRESS NOTES
Follow-up Note -  [facility]  Devendra Coyne 1960 male         ASSESSMENT & PLAN:    History of colon polyps  Personal history of colon polyps- patient is at increased risk for colon cancer screening.  Rule out colorectal lesions including polyps or malignancy.    -Schedule for colonoscopy.    -High-fiber diet.    -Patient was given instructions about the colonoscopy prep.    -Patient was explained about the risks and benefits of the procedure. Risks including but not limited to bleeding, infection, perforation were explained in detail. Also explained about less than 100% sensitivity with the exam and other alternatives.      Reason:  For colonoscopy    HPI:  Mr. Ramsay has history of large hepatic flexure flat polyp that was removed a couple of times in the past and the last colonoscopy was in October 2022.  He was advised to come back in 6 months at that time.  Patient has regular bowel movements and denies any blood or mucus in the stool.  Appetite is good and denies any recent weight loss.  Denies any abdominal pain, nausea, or vomiting.  Has no heartburn or acid reflux.  Denies any difficulty swallowing.    Chaperon: Ms. Cortez    REVIEW OF SYSTEMS: Review of Systems   Constitutional:  Negative for activity change, appetite change, chills, diaphoresis, fatigue, fever and unexpected weight change.   HENT:  Negative for ear discharge, ear pain, facial swelling, hearing loss, nosebleeds, sore throat, tinnitus and voice change.    Eyes:  Negative for pain, discharge, redness, itching and visual disturbance.   Respiratory:  Negative for apnea, cough, chest tightness, shortness of breath and wheezing.    Cardiovascular:  Negative for chest pain and palpitations.   Gastrointestinal:         As noted in HPI   Endocrine: Negative for cold intolerance, heat intolerance and polyuria.   Genitourinary:  Negative for difficulty urinating, dysuria, flank pain, hematuria and urgency.    Musculoskeletal:  Negative for arthralgias, back pain, gait problem, joint swelling and myalgias.   Skin:  Negative for rash and wound.   Neurological:  Negative for dizziness, tremors, seizures, speech difficulty, light-headedness, numbness and headaches.   Hematological:  Negative for adenopathy. Does not bruise/bleed easily.   Psychiatric/Behavioral:  Negative for agitation, behavioral problems and confusion. The patient is not nervous/anxious.         Past Medical History:   Diagnosis Date    Anxiety     CAD (coronary artery disease)     Colon polyp     Hyperlipidemia       Past Surgical History:   Procedure Laterality Date    COLONOSCOPY      CORONARY ARTERY BYPASS GRAFT           Social History     Socioeconomic History    Marital status: Unknown     Spouse name: Not on file    Number of children: Not on file    Years of education: Not on file    Highest education level: Not on file   Occupational History    Not on file   Tobacco Use    Smoking status: Former     Current packs/day: 1.00     Average packs/day: 1 pack/day for 12.0 years (12.0 ttl pk-yrs)     Types: Cigarettes    Smokeless tobacco: Never   Vaping Use    Vaping status: Never Used   Substance and Sexual Activity    Alcohol use: Never    Drug use: Never    Sexual activity: Yes     Partners: Female   Other Topics Concern    Not on file   Social History Narrative    Not on file     Social Determinants of Health     Financial Resource Strain: Not on file   Food Insecurity: Not on file   Transportation Needs: Not on file   Physical Activity: Not on file   Stress: Not on file   Social Connections: Not on file   Intimate Partner Violence: Not on file   Housing Stability: Not on file     Family History   Problem Relation Age of Onset    Diabetes Mother     Lung cancer Mother     Cancer Mother         Mother  of lung cancer    No Known Problems Father      Patient has no known allergies.  Current Outpatient Medications   Medication Sig Dispense  "Refill    aspirin (ECOTRIN LOW STRENGTH) 81 mg EC tablet Take 81 mg by mouth daily      atorvastatin (LIPITOR) 80 mg tablet Take 1 tablet (80 mg total) by mouth daily 90 tablet 3    levothyroxine 75 mcg tablet Take 1 tablet (75 mcg total) by mouth daily in the early morning 90 tablet 0    sertraline (ZOLOFT) 100 mg tablet Take 1 tablet (100 mg total) by mouth daily 90 tablet 3    sodium picosulfate, magnesium oxide, citric acid (Clenpiq) oral solution Take 175 mL (1 bottle) the evening before the colonoscopy, between 5 PM and 9 PM, followed by a second 175 mL bottle 5 hours before the colonoscopy. 350 mL 0     No current facility-administered medications for this visit.       Blood pressure 130/87, pulse 70, height 5' 9\" (1.753 m), weight 91.4 kg (201 lb 9.6 oz).    PHYSICAL EXAM: Physical Exam  Constitutional:       Appearance: He is well-developed.   HENT:      Head: Normocephalic and atraumatic.   Eyes:      General: No scleral icterus.        Right eye: No discharge.         Left eye: No discharge.      Conjunctiva/sclera: Conjunctivae normal.      Pupils: Pupils are equal, round, and reactive to light.   Neck:      Thyroid: No thyromegaly.      Vascular: No JVD.      Trachea: No tracheal deviation.   Cardiovascular:      Rate and Rhythm: Normal rate and regular rhythm.      Heart sounds: Normal heart sounds. No murmur heard.     No friction rub. No gallop.   Pulmonary:      Effort: Pulmonary effort is normal. No respiratory distress.      Breath sounds: Normal breath sounds. No wheezing or rales.   Chest:      Chest wall: No tenderness.   Abdominal:      General: Bowel sounds are normal. There is no distension.      Palpations: Abdomen is soft. There is no mass.      Tenderness: There is no abdominal tenderness. There is no guarding or rebound.      Hernia: No hernia is present.   Musculoskeletal:      Cervical back: Neck supple.   Lymphadenopathy:      Cervical: No cervical adenopathy.   Skin:     General: " "Skin is warm and dry.      Findings: No erythema or rash.   Neurological:      Mental Status: He is alert and oriented to person, place, and time.   Psychiatric:         Behavior: Behavior normal.         Thought Content: Thought content normal.          Lab Results   Component Value Date    WBC 8.2 02/23/2024    HGB 16.5 02/23/2024    HCT 47.0 02/23/2024    MCV 93 02/23/2024     02/23/2024     Lab Results   Component Value Date    CALCIUM 8.5 07/19/2022    K 5.2 02/06/2024    CO2 23 02/06/2024     02/06/2024    BUN 18 02/06/2024    CREATININE 1.13 02/06/2024     Lab Results   Component Value Date    ALT 39 02/06/2024    AST 32 02/06/2024    ALKPHOS 49 07/19/2022     No results found for: \"INR\", \"PROTIME\"    Colonoscopy    Result Date: 10/25/2022  Impression: Residual polyp seen at the tattooed area, this was removed using EMR Scattered diverticulosis in the left side RECOMMENDATION: Await pathology results Repeat colonoscopy in 6 months due to a personal history of colon polyps  Chris Treviño MD         "

## 2024-06-10 ENCOUNTER — ANESTHESIA EVENT (OUTPATIENT)
Dept: ANESTHESIOLOGY | Facility: HOSPITAL | Age: 64
End: 2024-06-10

## 2024-06-10 ENCOUNTER — ANESTHESIA (OUTPATIENT)
Dept: ANESTHESIOLOGY | Facility: HOSPITAL | Age: 64
End: 2024-06-10

## 2024-06-19 ENCOUNTER — TELEPHONE (OUTPATIENT)
Dept: GASTROENTEROLOGY | Facility: AMBULARY SURGERY CENTER | Age: 64
End: 2024-06-19

## 2024-06-24 RX ORDER — SODIUM CHLORIDE, SODIUM LACTATE, POTASSIUM CHLORIDE, CALCIUM CHLORIDE 600; 310; 30; 20 MG/100ML; MG/100ML; MG/100ML; MG/100ML
75 INJECTION, SOLUTION INTRAVENOUS CONTINUOUS
Status: CANCELLED | OUTPATIENT
Start: 2024-06-24

## 2024-06-25 ENCOUNTER — ANESTHESIA EVENT (OUTPATIENT)
Dept: GASTROENTEROLOGY | Facility: AMBULARY SURGERY CENTER | Age: 64
End: 2024-06-25

## 2024-06-25 ENCOUNTER — ANESTHESIA (OUTPATIENT)
Dept: GASTROENTEROLOGY | Facility: AMBULARY SURGERY CENTER | Age: 64
End: 2024-06-25

## 2024-06-25 ENCOUNTER — HOSPITAL ENCOUNTER (OUTPATIENT)
Dept: GASTROENTEROLOGY | Facility: AMBULARY SURGERY CENTER | Age: 64
Setting detail: OUTPATIENT SURGERY
Discharge: HOME | End: 2024-06-25
Attending: INTERNAL MEDICINE | Admitting: INTERNAL MEDICINE
Payer: COMMERCIAL

## 2024-06-25 VITALS
SYSTOLIC BLOOD PRESSURE: 125 MMHG | DIASTOLIC BLOOD PRESSURE: 75 MMHG | HEART RATE: 62 BPM | OXYGEN SATURATION: 99 % | TEMPERATURE: 96.9 F | RESPIRATION RATE: 18 BRPM

## 2024-06-25 DIAGNOSIS — Z86.010 HISTORY OF COLON POLYPS: ICD-10-CM

## 2024-06-25 PROBLEM — Z95.1 HISTORY OF CORONARY ARTERY BYPASS GRAFT: Status: ACTIVE | Noted: 2024-06-25

## 2024-06-25 PROCEDURE — G0105 COLORECTAL SCRN; HI RISK IND: HCPCS | Performed by: INTERNAL MEDICINE

## 2024-06-25 RX ORDER — PROPOFOL 10 MG/ML
INJECTION, EMULSION INTRAVENOUS AS NEEDED
Status: DISCONTINUED | OUTPATIENT
Start: 2024-06-25 | End: 2024-06-25

## 2024-06-25 RX ORDER — SODIUM CHLORIDE, SODIUM LACTATE, POTASSIUM CHLORIDE, CALCIUM CHLORIDE 600; 310; 30; 20 MG/100ML; MG/100ML; MG/100ML; MG/100ML
75 INJECTION, SOLUTION INTRAVENOUS CONTINUOUS
Status: DISCONTINUED | OUTPATIENT
Start: 2024-06-25 | End: 2024-06-29 | Stop reason: HOSPADM

## 2024-06-25 RX ADMIN — PROPOFOL 120 MG: 10 INJECTION, EMULSION INTRAVENOUS at 07:32

## 2024-06-25 RX ADMIN — PROPOFOL 30 MG: 10 INJECTION, EMULSION INTRAVENOUS at 07:43

## 2024-06-25 RX ADMIN — PROPOFOL 20 MG: 10 INJECTION, EMULSION INTRAVENOUS at 07:38

## 2024-06-25 RX ADMIN — SODIUM CHLORIDE, SODIUM LACTATE, POTASSIUM CHLORIDE, AND CALCIUM CHLORIDE: .6; .31; .03; .02 INJECTION, SOLUTION INTRAVENOUS at 07:22

## 2024-06-25 RX ADMIN — PROPOFOL 30 MG: 10 INJECTION, EMULSION INTRAVENOUS at 07:40

## 2024-06-25 RX ADMIN — PROPOFOL 30 MG: 10 INJECTION, EMULSION INTRAVENOUS at 07:35

## 2024-06-25 NOTE — ANESTHESIA PREPROCEDURE EVALUATION
Procedure:  COLONOSCOPY    Relevant Problems   CARDIO   (+) CAD (coronary artery disease) of artery bypass graft   (+) History of coronary artery bypass graft 2011   (+) Pure hypercholesterolemia      ENDO   (+) Hypothyroidism      NEURO/PSYCH   (+) Recurrent major depressive disorder, in full remission (HCC)      Other   (+) Lyme disease        Physical Exam    Airway    Mallampati score: II  TM Distance: >3 FB  Neck ROM: full     Dental       Cardiovascular  Rhythm: regular, Rate: normal    Pulmonary   Breath sounds clear to auscultation    Other Findings        Anesthesia Plan  ASA Score- 3     Anesthesia Type- IV sedation with anesthesia with ASA Monitors.         Additional Monitors:     Airway Plan:            Plan Factors-    Chart reviewed.        Patient is not a current smoker.              Induction- intravenous.    Postoperative Plan-     Perioperative Resuscitation Plan - Level 1 - Full Code.       Informed Consent- Anesthetic plan and risks discussed with patient.  I personally reviewed this patient with the CRNA. Discussed and agreed on the Anesthesia Plan with the CRNA..

## 2024-06-25 NOTE — ANESTHESIA POSTPROCEDURE EVALUATION
Post-Op Assessment Note    CV Status:  Stable    Pain management: adequate       Mental Status:  Alert and awake   Hydration Status:  Euvolemic   PONV Controlled:  Controlled   Airway Patency:  Patent     Post Op Vitals Reviewed: Yes    No anethesia notable event occurred.    Staff: CRNA               BP   124/72   Temp   98.7   Pulse  77   Resp   18   SpO2   99

## 2024-06-25 NOTE — H&P
History and Physical -  [facility]  Devendra Coyne 64 y.o. male MRN: [id number]        HPI: 64-year-old male with history of colon polyps.  Regular bowel movements.    Historical Information   Past Medical History:   Diagnosis Date    Anxiety     CAD (coronary artery disease)     Colon polyp     Hyperlipidemia      Past Surgical History:   Procedure Laterality Date    COLONOSCOPY      CORONARY ARTERY BYPASS GRAFT           Social History   Social History     Substance and Sexual Activity   Alcohol Use Never     Social History     Substance and Sexual Activity   Drug Use Never     Social History     Tobacco Use   Smoking Status Former    Current packs/day: 1.00    Average packs/day: 1 pack/day for 12.0 years (12.0 ttl pk-yrs)    Types: Cigarettes   Smokeless Tobacco Never     Family History   Problem Relation Age of Onset    Diabetes Mother     Lung cancer Mother     Cancer Mother         Mother  of lung cancer    No Known Problems Father        Meds/Allergies     Not in a hospital admission.    No Known Allergies    Objective     Blood pressure 162/76, pulse 64, temperature (!) 96.9 °F (36.1 °C), temperature source Temporal, resp. rate 18, SpO2 98%.    Physical Exam:    Chest- CTA  Heart- RRR  Abdomen- NT/ND  Extremities- No edema    ASSESSMENT:     History of colon polyps    PLAN:    Colonoscopy

## 2024-09-09 ENCOUNTER — OFFICE VISIT (OUTPATIENT)
Dept: URGENT CARE | Facility: CLINIC | Age: 64
End: 2024-09-09
Payer: COMMERCIAL

## 2024-09-09 VITALS
BODY MASS INDEX: 29.47 KG/M2 | HEART RATE: 85 BPM | TEMPERATURE: 96.9 F | HEIGHT: 69 IN | SYSTOLIC BLOOD PRESSURE: 135 MMHG | WEIGHT: 199 LBS | DIASTOLIC BLOOD PRESSURE: 74 MMHG | RESPIRATION RATE: 18 BRPM | OXYGEN SATURATION: 96 %

## 2024-09-09 DIAGNOSIS — B34.9 VIRAL ILLNESS: Primary | ICD-10-CM

## 2024-09-09 DIAGNOSIS — J01.00 ACUTE NON-RECURRENT MAXILLARY SINUSITIS: ICD-10-CM

## 2024-09-09 LAB
SARS-COV-2 AG UPPER RESP QL IA: NEGATIVE
VALID CONTROL: NORMAL

## 2024-09-09 PROCEDURE — 99213 OFFICE O/P EST LOW 20 MIN: CPT | Performed by: PHYSICIAN ASSISTANT

## 2024-09-09 PROCEDURE — 87811 SARS-COV-2 COVID19 W/OPTIC: CPT | Performed by: PHYSICIAN ASSISTANT

## 2024-09-09 RX ORDER — PREDNISONE 10 MG/1
30 TABLET ORAL DAILY
Qty: 9 TABLET | Refills: 0 | Status: SHIPPED | OUTPATIENT
Start: 2024-09-09 | End: 2024-09-12

## 2024-09-09 NOTE — LETTER
September 9, 2024     Patient: Devendra Coyne  YOB: 1960  Date of Visit: 9/9/2024      To Whom it May Concern:    Devendra Coyne is under my professional care. Devendra was seen in my office on 9/9/2024. Devendra may return to work on 9/11/24 .    If you have any questions or concerns, please don't hesitate to call.         Sincerely,          Sushma Barrientos PA-C        CC: No Recipients

## 2024-09-09 NOTE — PATIENT INSTRUCTIONS
"Patient Education     Sinusitis in adults   The Basics   Written by the doctors and editors at [facility]   What is sinusitis? -- Sinusitis is a condition that can cause a stuffy nose, pain in the face, and discharge or \"mucus\" from the nose.  The sinuses are hollow areas in the bones of the face (figure 1). They have a thin lining that normally makes a small amount of mucus. When this lining gets irritated or infected, it swells and makes extra mucus. This causes symptoms.  Sinusitis usually happens after a person gets sick with a cold. The germs causing the cold can infect the sinuses, too. Sometimes, other germs can be the cause of the infection. Often, a person feels like their cold is getting better. But then, they get sinusitis and begin to feel sick again.  What are the symptoms of sinusitis? -- Common symptoms of sinusitis include:   Stuffy or blocked nose   Thick white, yellow, or green discharge from the nose   Pain in the teeth   Pain or pressure in the face - This often feels worse when a person bends forward.  People with sinusitis can also have other symptoms, such as:   Fever   Cough   Trouble smelling   Ear pressure or fullness   Headache   Bad breath   Feeling tired  Most of the time, symptoms start to improve in 7 to 10 days.  Should I see a doctor or nurse? -- See your doctor or nurse if your symptoms last more than 10 days, or if your symptoms first get better but then get worse.  Rarely, sinusitis can lead to serious problems. See your doctor or nurse right away (do not wait 10 days) if you have:   Fever higher than 102°F (38.9°C)   Sudden and severe pain in the face and head   Trouble seeing, or seeing double   Trouble thinking clearly   Swelling or redness around 1 or both eyes   Stiff neck  Is there anything I can do on my own to feel better? -- Yes. To help with your symptoms, you can:   Take an over-the-counter pain reliever to reduce the pain.   Rinse your nose and sinuses with salt water a " "few times a day - Ask your doctor or nurse about the best way to do this.   Drink plenty of fluids - Staying hydrated might help to thin the mucus and make it drain more easily.  Your doctor might also recommend a steroid nose spray to reduce the swelling in your nose, especially if you have allergies. Talk to your doctor if you are thinking of using a steroid spray.  How is sinusitis treated? -- Most of the time, sinusitis does not need to be treated with antibiotic medicines. This is because most sinusitis is caused by viruses, not bacteria, and antibiotics do not kill viruses. In fact, even sinusitis caused by bacteria will usually get better on its own without antibiotics.  Some people with sinusitis do need treatment with antibiotics. If your symptoms have not improved after 10 days, ask your doctor if you should take antibiotics. They might recommend that you wait 1 more week to see if your symptoms improve. But if you have symptoms such as a fever or a lot of pain, they might prescribe antibiotics. If you do get antibiotics, follow all of your doctor's instructions about taking them.  What if my symptoms do not get better? -- If your symptoms do not get better, talk with your doctor or nurse. They might order tests to figure out why you still have symptoms. These can include:   CT scan or other imaging tests - Imaging tests create pictures of the inside of the body.   A test to look inside the sinuses - For this test, a doctor puts a thin tube with a camera on the end into the nose and up into the sinuses.  Some people get a lot of sinus infections or have symptoms that last at least 3 months. These people can have a different type of sinusitis called \"chronic sinusitis.\" Chronic sinusitis can be caused by different things. For example, some people have growths inside their nose or sinuses that are called \"polyps.\" Other people have allergies that cause their symptoms.  Chronic sinusitis can be treated in " different ways. If you have chronic sinusitis, talk with your doctor about which treatments are right for you.  All topics are updated as new evidence becomes available and our peer review process is complete.  This topic retrieved from [employer] on: Feb 28, 2024.  Topic 32973 Version 21.0  Release: 32.2.4 - C32.58  © 2024 UpToDate, Inc. and/or its affiliates. All rights reserved.  figure 1: Sinuses of the face     The sinuses are hollow areas in the bones of the face. This drawing shows where the sinuses are, from the side and front views. There are 4 pairs of sinuses, named for the bones around them: sphenoid, frontal, ethmoid, and maxillary.  Graphic 974123 Version 3.0  Consumer Information Use and Disclaimer   Disclaimer: This generalized information is a limited summary of diagnosis, treatment, and/or medication information. It is not meant to be comprehensive and should be used as a tool to help the user understand and/or assess potential diagnostic and treatment options. It does NOT include all information about conditions, treatments, medications, side effects, or risks that may apply to a specific patient. It is not intended to be medical advice or a substitute for the medical advice, diagnosis, or treatment of a health care provider based on the health care provider's examination and assessment of a patient's specific and unique circumstances. Patients must speak with a health care provider for complete information about their health, medical questions, and treatment options, including any risks or benefits regarding use of medications. This information does not endorse any treatments or medications as safe, effective, or approved for treating a specific patient. UpToDate, Inc. and its affiliates disclaim any warranty or liability relating to this information or the use thereof.The use of this information is governed by the Terms of Use, available at  https://www.[employer].com/en/know/clinical-effectiveness-terms. 2024© [employer] and its affiliates and/or licensors. All rights reserved.  Copyright   © 2024 [employer] and/or its affiliates. All rights reserved.

## 2024-09-09 NOTE — PROGRESS NOTES
Assessment/Plan    Viral illness [B34.9]  1. Viral illness  Poct Covid 19 Rapid Antigen Test      2. Acute non-recurrent maxillary sinusitis  amoxicillin-clavulanate (AUGMENTIN) 875-125 mg per tablet    predniSONE 10 mg tablet            Subjective:     Patient ID: Devendra Coyne is a 64 y.o. male.      Reason For Visit / Chief Complaint  Chief Complaint   Patient presents with    viral illness     Congestion, sinus pain, nasal drainage low grade temp 101 diarrhea.x 6 days         AK is a 64-year-old male with a PMHx of HLD and hypothyroidism presenting to the urgent care clinic today with congestion, sinus pain, nasal drainage low grade temp 101 diarrhea x 6 days. Stated that it began a couple days ago and slowly worsened, with the sinus pain increasing to the point where he couldn't sleep at all last night. He has missed work for 3 days now. Has tried Tylenol, ibuprofen, mucinex all without improvement. No provoking factors noted. Reports associated cough, post-nasal drip, decreased appetite, headache, dizziness, and diarrhea. Denies constipation, chest pain, shortness of breath, syncope, wheezing, vomiting, loss of taste or smell, eye discharge, or ear pain. Feels like prior episodes of COVID. No recent sick contacts. UTD on all vaccines. Reported having only the original 2 doses of COVID vaccine.             Past Medical History:   Diagnosis Date    Anxiety     CAD (coronary artery disease)     Colon polyp     Hyperlipidemia        Past Surgical History:   Procedure Laterality Date    COLONOSCOPY      CORONARY ARTERY BYPASS GRAFT             Family History   Problem Relation Age of Onset    Diabetes Mother     Lung cancer Mother     Cancer Mother         Mother  of lung cancer    No Known Problems Father        Review of Systems   Constitutional:  Positive for appetite change, fatigue and fever. Negative for chills.   HENT:  Positive for congestion, postnasal drip, sinus pressure, sinus pain and sore  "throat. Negative for ear discharge and ear pain.    Eyes:  Negative for pain, discharge, itching and visual disturbance.   Respiratory:  Positive for cough. Negative for shortness of breath and wheezing.    Cardiovascular:  Negative for chest pain.   Gastrointestinal:  Positive for diarrhea. Negative for constipation, nausea and vomiting.   Neurological:  Positive for dizziness and headaches.       Objective:    /74   Pulse 85   Temp (!) 96.9 °F (36.1 °C)   Resp 18   Ht 5' 9\" (1.753 m)   Wt 90.3 kg (199 lb)   SpO2 96%   BMI 29.39 kg/m²     Physical Exam  Constitutional:       General: He is not in acute distress.     Appearance: Normal appearance. He is normal weight. He is not ill-appearing, toxic-appearing or diaphoretic.   HENT:      Head: Normocephalic and atraumatic.      Right Ear: Tympanic membrane, ear canal and external ear normal. There is no impacted cerumen.      Left Ear: Tympanic membrane, ear canal and external ear normal. There is no impacted cerumen.      Nose: Nose normal. No congestion or rhinorrhea.      Mouth/Throat:      Mouth: Mucous membranes are moist.      Pharynx: Oropharynx is clear. No oropharyngeal exudate or posterior oropharyngeal erythema.   Cardiovascular:      Rate and Rhythm: Normal rate and regular rhythm.      Pulses: Normal pulses.      Heart sounds: Normal heart sounds. No murmur heard.     No friction rub. No gallop.   Pulmonary:      Effort: Pulmonary effort is normal. No respiratory distress.      Breath sounds: Normal breath sounds. No stridor. No wheezing, rhonchi or rales.   Chest:      Chest wall: No tenderness.   Abdominal:      General: Abdomen is flat. Bowel sounds are normal. There is no distension.      Palpations: Abdomen is soft. There is no mass.      Tenderness: There is no abdominal tenderness. There is no guarding or rebound.      Hernia: No hernia is present.   Musculoskeletal:      Cervical back: Normal range of motion.   Lymphadenopathy:      " Cervical: No cervical adenopathy.   Skin:     General: Skin is warm and dry.      Capillary Refill: Capillary refill takes less than 2 seconds.   Neurological:      Mental Status: He is alert.

## 2024-10-03 DIAGNOSIS — E03.9 ACQUIRED HYPOTHYROIDISM: ICD-10-CM

## 2024-10-03 RX ORDER — LEVOTHYROXINE SODIUM 75 UG/1
75 TABLET ORAL DAILY
Qty: 90 TABLET | Refills: 1 | Status: SHIPPED | OUTPATIENT
Start: 2024-10-03

## 2024-12-14 DIAGNOSIS — E78.00 PURE HYPERCHOLESTEROLEMIA: ICD-10-CM

## 2024-12-14 RX ORDER — ATORVASTATIN CALCIUM 80 MG/1
80 TABLET, FILM COATED ORAL DAILY
Qty: 90 TABLET | Refills: 3 | Status: SHIPPED | OUTPATIENT
Start: 2024-12-14

## 2025-01-27 ENCOUNTER — OFFICE VISIT (OUTPATIENT)
Dept: FAMILY MEDICINE CLINIC | Facility: CLINIC | Age: 65
End: 2025-01-27
Payer: COMMERCIAL

## 2025-01-27 VITALS
SYSTOLIC BLOOD PRESSURE: 128 MMHG | HEIGHT: 69 IN | HEART RATE: 77 BPM | RESPIRATION RATE: 17 BRPM | OXYGEN SATURATION: 98 % | BODY MASS INDEX: 29.33 KG/M2 | TEMPERATURE: 98.1 F | DIASTOLIC BLOOD PRESSURE: 78 MMHG | WEIGHT: 198 LBS

## 2025-01-27 DIAGNOSIS — Z00.00 ANNUAL PHYSICAL EXAM: Primary | ICD-10-CM

## 2025-01-27 DIAGNOSIS — Z12.83 SKIN CANCER SCREENING: ICD-10-CM

## 2025-01-27 DIAGNOSIS — E03.9 ACQUIRED HYPOTHYROIDISM: ICD-10-CM

## 2025-01-27 DIAGNOSIS — F33.42 RECURRENT MAJOR DEPRESSIVE DISORDER, IN FULL REMISSION (HCC): ICD-10-CM

## 2025-01-27 DIAGNOSIS — E78.00 PURE HYPERCHOLESTEROLEMIA: ICD-10-CM

## 2025-01-27 PROCEDURE — 99396 PREV VISIT EST AGE 40-64: CPT | Performed by: FAMILY MEDICINE

## 2025-01-27 NOTE — PROGRESS NOTES
Adult Annual Physical  Name: Devendra Coyne      : 1960      MRN: [id number]  Encounter Provider: Yessenia Ortiz MD  Encounter Date: 2025   Encounter department: [facility]    Assessment & Plan  Annual physical exam    Orders:  •  Comprehensive metabolic panel  •  CBC and differential  •  Lipid panel  •  Hemoglobin A1C  •  PSA Total, Diagnostic; Future    Acquired hypothyroidism  Doing well on levothyroxine  Orders:  •  TSH, 3rd generation with Free T4 reflex    Recurrent major depressive disorder, in full remission (HCC)  Doing well on sertraline 100 mg daily       Pure hypercholesterolemia    Orders:  •  Comprehensive metabolic panel  •  Lipid panel    Skin cancer screening    Orders:  •  Ambulatory Referral to Dermatology; Future    Immunizations and preventive care screenings were discussed with patient today. Appropriate education was printed on patient's after visit summary.    Discussed risks and benefits of prostate cancer screening. We discussed the controversial history of PSA screening for prostate cancer in the [place] as well as the risk of over detection and over treatment of prostate cancer by way of PSA screening.  The patient understands that PSA blood testing is an imperfect way to screen for prostate cancer and that elevated PSA levels in the blood may also be caused by infection, inflammation, prostatic trauma or manipulation, urological procedures, or by benign prostatic enlargement.    The role of the digital rectal examination in prostate cancer screening was also discussed and I discussed with him that there is large interobserver variability in the findings of digital rectal examination.    Counseling:  Alcohol/drug use: discussed moderation in alcohol intake, the recommendations for healthy alcohol use, and avoidance of illicit drug use.  Dental Health: discussed importance of regular tooth brushing, flossing, and dental visits.  Injury prevention:  discussed safety/seat belts, safety helmets, smoke detectors, carbon monoxide detectors, and smoking near bedding or upholstery.  Sexual health: discussed sexually transmitted diseases, partner selection, use of condoms, avoidance of unintended pregnancy, and contraceptive alternatives.  Exercise: the importance of regular exercise/physical activity was discussed. Recommend exercise 3-5 times per week for at least 30 minutes.       Depression Screening and Follow-up Plan: Patient was screened for depression during today's encounter. They screened negative with a PHQ-9 score of 0.        History of Present Illness     Adult Annual Physical:  Patient presents for annual physical.     Diet and Physical Activity:  - Diet/Nutrition: well balanced diet and consuming 3-5 servings of fruits/vegetables daily.  - Exercise: moderate cardiovascular exercise.    Depression Screening:    - PHQ-9 Score: 0    General Health:  - Sleep: sleeps well and 7-8 hours of sleep on average.  - Hearing: normal hearing bilateral ears.  - Vision: goes for regular eye exams.  - Dental: regular dental visits and brushes teeth twice daily.    /GYN Health:    - History of STDs: no     Health:  - History of STDs: no.     Advanced Care Planning:  - Has an advanced directive?: no    - Has a durable medical POA?: no    - ACP document given to patient?: no      Review of Systems   Constitutional: Negative.  Negative for chills and fever.   HENT: Negative.  Negative for ear pain and sore throat.    Eyes: Negative.  Negative for pain and visual disturbance.   Respiratory:  Negative for cough and shortness of breath.    Cardiovascular:  Negative for chest pain and palpitations.   Gastrointestinal:  Negative for abdominal pain and vomiting.   Genitourinary: Negative.  Negative for dysuria and hematuria.   Musculoskeletal:  Negative for arthralgias and back pain.   Skin:  Negative for color change and rash.   Neurological:  Negative for seizures and  syncope.   Hematological: Negative.    Psychiatric/Behavioral: Negative.     All other systems reviewed and are negative.    Medical History Reviewed by provider this encounter:  Allergies  Meds  Problems     .  Past Medical History   Past Medical History:   Diagnosis Date   • Anxiety    • CAD (coronary artery disease)    • Colon polyp    • Hyperlipidemia      Past Surgical History:   Procedure Laterality Date   • COLONOSCOPY     • CORONARY ARTERY BYPASS GRAFT           Family History   Problem Relation Age of Onset   • Diabetes Mother    • Lung cancer Mother    • Cancer Mother         Mother  of lung cancer   • No Known Problems Father       reports that he has quit smoking. His smoking use included cigarettes. He has a 12 pack-year smoking history. He has never been exposed to tobacco smoke. He has never used smokeless tobacco. He reports that he does not drink alcohol and does not use drugs.  Current Outpatient Medications on File Prior to Visit   Medication Sig Dispense Refill   • aspirin (ECOTRIN LOW STRENGTH) 81 mg EC tablet Take 81 mg by mouth daily     • atorvastatin (LIPITOR) 80 mg tablet TAKE 1 TABLET BY MOUTH EVERY DAY 90 tablet 3   • levothyroxine 75 mcg tablet TAKE 1 TABLET BY MOUTH EVERY DAY 90 tablet 1   • sertraline (ZOLOFT) 100 mg tablet Take 1 tablet (100 mg total) by mouth daily 90 tablet 3   • [DISCONTINUED] sodium picosulfate, magnesium oxide, citric acid (Clenpiq) oral solution Take 175 mL (1 bottle) the evening before the colonoscopy, between 5 PM and 9 PM, followed by a second 175 mL bottle 5 hours before the colonoscopy. (Patient not taking: Reported on 2025) 350 mL 0     No current facility-administered medications on file prior to visit.   No Known Allergies   Current Outpatient Medications on File Prior to Visit   Medication Sig Dispense Refill   • aspirin (ECOTRIN LOW STRENGTH) 81 mg EC tablet Take 81 mg by mouth daily     • atorvastatin (LIPITOR) 80 mg tablet TAKE 1  "TABLET BY MOUTH EVERY DAY 90 tablet 3   • levothyroxine 75 mcg tablet TAKE 1 TABLET BY MOUTH EVERY DAY 90 tablet 1   • sertraline (ZOLOFT) 100 mg tablet Take 1 tablet (100 mg total) by mouth daily 90 tablet 3   • [DISCONTINUED] sodium picosulfate, magnesium oxide, citric acid (Clenpiq) oral solution Take 175 mL (1 bottle) the evening before the colonoscopy, between 5 PM and 9 PM, followed by a second 175 mL bottle 5 hours before the colonoscopy. (Patient not taking: Reported on 1/27/2025) 350 mL 0     No current facility-administered medications on file prior to visit.      Social History     Tobacco Use   • Smoking status: Former     Current packs/day: 1.00     Average packs/day: 1 pack/day for 12.0 years (12.0 ttl pk-yrs)     Types: Cigarettes     Passive exposure: Never   • Smokeless tobacco: Never   Vaping Use   • Vaping status: Never Used   Substance and Sexual Activity   • Alcohol use: Never   • Drug use: Never   • Sexual activity: Yes     Partners: Female       Objective   /78 (BP Location: Left arm, Patient Position: Sitting, Cuff Size: Standard)   Pulse 77   Temp 98.1 °F (36.7 °C) (Tympanic)   Resp 17   Ht 5' 8.66\" (1.744 m)   Wt 89.8 kg (198 lb)   SpO2 98%   BMI 29.53 kg/m²     Physical Exam  Vitals and nursing note reviewed.   Constitutional:       Appearance: Normal appearance.   HENT:      Head: Normocephalic and atraumatic.      Right Ear: Tympanic membrane normal.      Left Ear: Tympanic membrane normal.      Nose: Nose normal.      Mouth/Throat:      Mouth: Mucous membranes are moist.   Eyes:      Extraocular Movements: Extraocular movements intact.      Pupils: Pupils are equal, round, and reactive to light.   Cardiovascular:      Rate and Rhythm: Normal rate and regular rhythm.      Pulses: Normal pulses.      Heart sounds: Normal heart sounds.   Pulmonary:      Effort: Pulmonary effort is normal.      Breath sounds: Normal breath sounds.   Abdominal:      General: Bowel sounds are " normal.   Musculoskeletal:      Cervical back: Normal range of motion.   Skin:     General: Skin is warm.      Capillary Refill: Capillary refill takes less than 2 seconds.   Neurological:      General: No focal deficit present.      Mental Status: He is alert and oriented to person, place, and time.   Psychiatric:         Mood and Affect: Mood normal.         Behavior: Behavior normal.

## 2025-02-08 DIAGNOSIS — F33.42 RECURRENT MAJOR DEPRESSIVE DISORDER, IN FULL REMISSION (HCC): ICD-10-CM

## 2025-02-09 RX ORDER — SERTRALINE HYDROCHLORIDE 100 MG/1
100 TABLET, FILM COATED ORAL DAILY
Qty: 90 TABLET | Refills: 1 | Status: SHIPPED | OUTPATIENT
Start: 2025-02-09

## 2025-02-11 ENCOUNTER — RESULTS FOLLOW-UP (OUTPATIENT)
Dept: FAMILY MEDICINE CLINIC | Facility: CLINIC | Age: 65
End: 2025-02-11

## 2025-02-11 LAB
ALBUMIN SERPL-MCNC: 4.9 G/DL (ref 3.9–4.9)
ALP SERPL-CCNC: 60 IU/L (ref 44–121)
ALT SERPL-CCNC: 46 IU/L (ref 0–44)
AST SERPL-CCNC: 33 IU/L (ref 0–40)
BASOPHILS # BLD AUTO: 0.1 X10E3/UL (ref 0–0.2)
BASOPHILS NFR BLD AUTO: 1 %
BILIRUB SERPL-MCNC: 0.8 MG/DL (ref 0–1.2)
BUN SERPL-MCNC: 12 MG/DL (ref 8–27)
BUN/CREAT SERPL: 10 (ref 10–24)
CALCIUM SERPL-MCNC: 9.7 MG/DL (ref 8.6–10.2)
CHLORIDE SERPL-SCNC: 99 MMOL/L (ref 96–106)
CHOLEST SERPL-MCNC: 138 MG/DL (ref 100–199)
CHOLEST/HDLC SERPL: 4.5 RATIO (ref 0–5)
CO2 SERPL-SCNC: 23 MMOL/L (ref 20–29)
CREAT SERPL-MCNC: 1.19 MG/DL (ref 0.76–1.27)
EGFR: 69 ML/MIN/1.73
EOSINOPHIL # BLD AUTO: 0.3 X10E3/UL (ref 0–0.4)
EOSINOPHIL NFR BLD AUTO: 5 %
ERYTHROCYTE [DISTWIDTH] IN BLOOD BY AUTOMATED COUNT: 12.4 % (ref 11.6–15.4)
EST. AVERAGE GLUCOSE BLD GHB EST-MCNC: 163 MG/DL
GLOBULIN SER-MCNC: 2.6 G/DL (ref 1.5–4.5)
GLUCOSE SERPL-MCNC: 126 MG/DL (ref 70–99)
HBA1C MFR BLD: 7.3 % (ref 4.8–5.6)
HCT VFR BLD AUTO: 49.6 % (ref 37.5–51)
HDLC SERPL-MCNC: 31 MG/DL
HGB BLD-MCNC: 17 G/DL (ref 13–17.7)
IMM GRANULOCYTES # BLD: 0 X10E3/UL (ref 0–0.1)
IMM GRANULOCYTES NFR BLD: 0 %
LDL DIRECT COMMENT: NORMAL
LDLC SERPL CALC-MCNC: 58 MG/DL (ref 0–99)
LDLC SERPL DIRECT ASSAY-MCNC: 58 MG/DL (ref 0–99)
LYMPHOCYTES # BLD AUTO: 2 X10E3/UL (ref 0.7–3.1)
LYMPHOCYTES NFR BLD AUTO: 28 %
MCH RBC QN AUTO: 31.7 PG (ref 26.6–33)
MCHC RBC AUTO-ENTMCNC: 34.3 G/DL (ref 31.5–35.7)
MCV RBC AUTO: 93 FL (ref 79–97)
MONOCYTES # BLD AUTO: 0.5 X10E3/UL (ref 0.1–0.9)
MONOCYTES NFR BLD AUTO: 6 %
NEUTROPHILS # BLD AUTO: 4.2 X10E3/UL (ref 1.4–7)
NEUTROPHILS NFR BLD AUTO: 60 %
PLATELET # BLD AUTO: 154 X10E3/UL (ref 150–450)
POTASSIUM SERPL-SCNC: 5.7 MMOL/L (ref 3.5–5.2)
PROT SERPL-MCNC: 7.5 G/DL (ref 6–8.5)
PSA SERPL-MCNC: 0.8 NG/ML (ref 0–4)
RBC # BLD AUTO: 5.36 X10E6/UL (ref 4.14–5.8)
SL AMB VLDL CHOLESTEROL CALC: 49 MG/DL (ref 5–40)
SODIUM SERPL-SCNC: 140 MMOL/L (ref 134–144)
TRIGL SERPL-MCNC: 317 MG/DL (ref 0–149)
TSH SERPL DL<=0.005 MIU/L-ACNC: 3.16 UIU/ML (ref 0.45–4.5)
WBC # BLD AUTO: 7.1 X10E3/UL (ref 3.4–10.8)

## 2025-03-29 DIAGNOSIS — E03.9 ACQUIRED HYPOTHYROIDISM: ICD-10-CM

## 2025-03-29 RX ORDER — LEVOTHYROXINE SODIUM 75 UG/1
75 TABLET ORAL DAILY
Qty: 90 TABLET | Refills: 1 | Status: SHIPPED | OUTPATIENT
Start: 2025-03-29

## 2025-03-31 ENCOUNTER — OFFICE VISIT (OUTPATIENT)
Dept: FAMILY MEDICINE CLINIC | Facility: CLINIC | Age: 65
End: 2025-03-31
Payer: COMMERCIAL

## 2025-03-31 VITALS
SYSTOLIC BLOOD PRESSURE: 120 MMHG | TEMPERATURE: 98.1 F | WEIGHT: 200 LBS | HEIGHT: 69 IN | BODY MASS INDEX: 29.62 KG/M2 | HEART RATE: 78 BPM | RESPIRATION RATE: 17 BRPM | OXYGEN SATURATION: 98 % | DIASTOLIC BLOOD PRESSURE: 70 MMHG

## 2025-03-31 DIAGNOSIS — E78.00 PURE HYPERCHOLESTEROLEMIA: ICD-10-CM

## 2025-03-31 DIAGNOSIS — F33.42 RECURRENT MAJOR DEPRESSIVE DISORDER, IN FULL REMISSION (HCC): ICD-10-CM

## 2025-03-31 DIAGNOSIS — E03.9 ACQUIRED HYPOTHYROIDISM: ICD-10-CM

## 2025-03-31 DIAGNOSIS — E11.9 TYPE 2 DIABETES MELLITUS WITHOUT COMPLICATION, WITHOUT LONG-TERM CURRENT USE OF INSULIN (HCC): Primary | ICD-10-CM

## 2025-03-31 DIAGNOSIS — I25.709 CORONARY ARTERY DISEASE INVOLVING CORONARY BYPASS GRAFT WITH ANGINA PECTORIS, UNSPECIFIED WHETHER NATIVE OR TRANSPLANTED HEART (HCC): ICD-10-CM

## 2025-03-31 PROBLEM — A69.20 LYME DISEASE: Status: RESOLVED | Noted: 2019-08-06 | Resolved: 2025-03-31

## 2025-03-31 PROCEDURE — 92250 FUNDUS PHOTOGRAPHY W/I&R: CPT | Performed by: FAMILY MEDICINE

## 2025-03-31 PROCEDURE — 99214 OFFICE O/P EST MOD 30 MIN: CPT | Performed by: FAMILY MEDICINE

## 2025-03-31 RX ORDER — METFORMIN HYDROCHLORIDE 750 MG/1
750 TABLET, EXTENDED RELEASE ORAL
Qty: 100 TABLET | Refills: 3 | Status: SHIPPED | OUTPATIENT
Start: 2025-03-31

## 2025-03-31 NOTE — ASSESSMENT & PLAN NOTE
Lab Results   Component Value Date    HGBA1C 7.3 (H) 02/10/2025   Newly diagnosed   To start metformin daily  Recheck in 3 months   Orders:  •  metFORMIN (GLUCOPHAGE-XR) 750 mg 24 hr tablet; Take 1 tablet (750 mg total) by mouth daily with breakfast  •  Albumin / creatinine urine ratio; Future  •  Comprehensive metabolic panel; Future  •  Hemoglobin A1C; Future  •  Lipid Panel with Direct LDL reflex; Future  •  CBC and Platelet; Future  •  IRIS Diabetic eye exam

## 2025-04-02 ENCOUNTER — RESULTS FOLLOW-UP (OUTPATIENT)
Dept: FAMILY MEDICINE CLINIC | Facility: CLINIC | Age: 65
End: 2025-04-02

## 2025-04-02 LAB
LEFT EYE DIABETIC RETINOPATHY: NORMAL
LEFT EYE IMAGE QUALITY: NORMAL
LEFT EYE MACULAR EDEMA: NORMAL
LEFT EYE OTHER RETINOPATHY: NORMAL
RIGHT EYE DIABETIC RETINOPATHY: NORMAL
RIGHT EYE IMAGE QUALITY: NORMAL
RIGHT EYE MACULAR EDEMA: NORMAL
RIGHT EYE OTHER RETINOPATHY: NORMAL
SEVERITY (EYE EXAM): NORMAL

## 2025-07-10 ENCOUNTER — OFFICE VISIT (OUTPATIENT)
Dept: FAMILY MEDICINE CLINIC | Facility: CLINIC | Age: 65
End: 2025-07-10
Payer: COMMERCIAL

## 2025-07-10 ENCOUNTER — NURSE TRIAGE (OUTPATIENT)
Age: 65
End: 2025-07-10

## 2025-07-10 VITALS
TEMPERATURE: 96.8 F | HEART RATE: 72 BPM | BODY MASS INDEX: 29.38 KG/M2 | RESPIRATION RATE: 16 BRPM | HEIGHT: 69 IN | WEIGHT: 198.4 LBS | SYSTOLIC BLOOD PRESSURE: 138 MMHG | OXYGEN SATURATION: 98 % | DIASTOLIC BLOOD PRESSURE: 80 MMHG

## 2025-07-10 DIAGNOSIS — K57.92 DIVERTICULITIS: Primary | ICD-10-CM

## 2025-07-10 PROCEDURE — 99213 OFFICE O/P EST LOW 20 MIN: CPT | Performed by: FAMILY MEDICINE

## 2025-07-10 NOTE — TELEPHONE ENCOUNTER
"REASON FOR CONVERSATION: Abdominal Pain    SYMPTOMS: Mild intermittent LLQ abdominal pain for three days.  Patient states that the the pain is only present when he palpates the area.  No N/V. Patient's stools have been looser and more frequent.  No fever.     OTHER HEALTH INFORMATION: patient started on metformin a few months ago and has not been feeling well on it.  Stopped metformin about 2 days ago.     PROTOCOL DISPOSITION: See Today or Tomorrow in Office    CARE ADVICE PROVIDED: See in office today.     PRACTICE FOLLOW-UP: no      Reason for Disposition   MILD pain (e.g., does not interfere with normal activities) and pain comes and goes (cramps) lasts > 48 hours  (Exception: This same abdominal pain is a chronic symptom recurrent or ongoing AND present > 4 weeks.)    Answer Assessment - Initial Assessment Questions  1. LOCATION: \"Where does it hurt?\"       LLQ pain   2. RADIATION: \"Does the pain shoot anywhere else?\" (e.g., chest, back)      no  3. ONSET: \"When did the pain begin?\" (Minutes, hours or days ago)       About two days ago   4. SUDDEN: \"Gradual or sudden onset?\"      Gradual   5. PATTERN \"Does the pain come and go, or is it constant?\"      Intermittent   6. SEVERITY: \"How bad is the pain?\"  (e.g., Scale 1-10; mild, moderate, or severe)      Mild 3/10  7. RECURRENT SYMPTOM: \"Have you ever had this type of stomach pain before?\" If Yes, ask: \"When was the last time?\" and \"What happened that time?\"       Yes a few weeks ago   8. CAUSE: \"What do you think is causing the stomach pain?\"      I'm not sure   9. RELIEVING/AGGRAVATING FACTORS: \"What makes it better or worse?\" (e.g., antacids, bending or twisting motion, bowel movement)     Pushing on LLQ makes it worse   10. OTHER SYMPTOMS: \"Do you have any other symptoms?\" (e.g., back pain, diarrhea, fever, urination pain, vomiting)       Diarrhea    Protocols used: Abdominal Pain - Male-Adult-[place]    "

## 2025-07-16 ENCOUNTER — OFFICE VISIT (OUTPATIENT)
Dept: FAMILY MEDICINE CLINIC | Facility: CLINIC | Age: 65
End: 2025-07-16
Payer: COMMERCIAL

## 2025-07-16 VITALS
HEART RATE: 80 BPM | HEIGHT: 69 IN | BODY MASS INDEX: 29.03 KG/M2 | DIASTOLIC BLOOD PRESSURE: 80 MMHG | RESPIRATION RATE: 17 BRPM | OXYGEN SATURATION: 98 % | WEIGHT: 196 LBS | TEMPERATURE: 97.3 F | SYSTOLIC BLOOD PRESSURE: 126 MMHG

## 2025-07-16 DIAGNOSIS — E11.9 TYPE 2 DIABETES MELLITUS WITHOUT COMPLICATION, WITHOUT LONG-TERM CURRENT USE OF INSULIN (HCC): Primary | ICD-10-CM

## 2025-07-16 DIAGNOSIS — F33.42 RECURRENT MAJOR DEPRESSIVE DISORDER, IN FULL REMISSION (HCC): ICD-10-CM

## 2025-07-16 DIAGNOSIS — K57.92 DIVERTICULITIS: ICD-10-CM

## 2025-07-16 PROCEDURE — 99214 OFFICE O/P EST MOD 30 MIN: CPT | Performed by: FAMILY MEDICINE

## 2025-07-16 NOTE — ASSESSMENT & PLAN NOTE
Lab Results   Component Value Date    HGBA1C 7.3 (H) 02/10/2025   Metformin causes diarrhea   Added jardiance today   Orders:  •  Empagliflozin 25 MG TABS; Take 1 tablet (25 mg total) by mouth daily

## 2025-07-16 NOTE — PROGRESS NOTES
"Name: Devendra Coyne      : 1960      MRN: [id number]  Encounter Provider: Yessenia Ortiz MD  Encounter Date: 2025   Encounter department: [facility]  :  Assessment & Plan  Type 2 diabetes mellitus without complication, without long-term current use of insulin (HCC)    Lab Results   Component Value Date    HGBA1C 7.3 (H) 02/10/2025   Metformin causes diarrhea   Added jardiance today   Orders:  •  Empagliflozin 25 MG TABS; Take 1 tablet (25 mg total) by mouth daily    Diverticulitis  Improved   Continue augmentin as directed       Recurrent major depressive disorder, in full remission (HCC)  Stable on sertraline                 History of Present Illness   HPI  2 months after starting metformin he started to have diarrhea daily   He stopped 1 week ago   Developed diverticulitis 1 week ago and was given Augmentin   Feels better  No pain today    Review of Systems   Constitutional: Negative.    HENT: Negative.     Eyes: Negative.    Respiratory: Negative.     Cardiovascular: Negative.    Gastrointestinal: Negative.    Genitourinary: Negative.    Neurological: Negative.    Hematological: Negative.    Psychiatric/Behavioral: Negative.         Objective   /80 (BP Location: Left arm, Patient Position: Sitting, Cuff Size: Standard)   Pulse 80   Temp (!) 97.3 °F (36.3 °C) (Tympanic)   Resp 17   Ht 5' 8.66\" (1.744 m)   Wt 88.9 kg (196 lb)   SpO2 98%   BMI 29.23 kg/m²      Physical Exam  Vitals and nursing note reviewed.   Constitutional:       Appearance: Normal appearance.     Cardiovascular:      Rate and Rhythm: Normal rate and regular rhythm.      Pulses: Normal pulses.      Heart sounds: Normal heart sounds.     Neurological:      General: No focal deficit present.      Mental Status: He is alert and oriented to person, place, and time.     Psychiatric:         Mood and Affect: Mood normal.         Behavior: Behavior normal.         "

## 2025-07-16 NOTE — ASSESSMENT & PLAN NOTE
-  left lower quadrant pain for the past 3 days.  Describes as mild however waxes and wanes.  No fevers, chills.  Does note he had similar pain a few weeks ago however seem to subside with some bowel movements.  This time it has not.  -No recent travel.  No diarrhea.  No blood in his stool.  -Colonoscopy last year that showed diverticulosis.  -Will treat clinically for diverticulitis with recurrent left lower quadrant pain.  -Dietary modifications reviewed.  -ER precautions discussed.  -Follow-up as needed.  Orders:  •  amoxicillin-clavulanate (AUGMENTIN) 875-125 mg per tablet; Take 1 tablet by mouth every 12 (twelve) hours for 10 days

## 2025-07-16 NOTE — PROGRESS NOTES
Name: Devendra Coyne      : 1960      MRN: [id number]  Encounter Provider: Pedro Mckenzie DO  Encounter Date: 7/10/2025   Encounter department: [facility]    Assessment & Plan  Diverticulitis  -  left lower quadrant pain for the past 3 days.  Describes as mild however waxes and wanes.  No fevers, chills.  Does note he had similar pain a few weeks ago however seem to subside with some bowel movements.  This time it has not.  -No recent travel.  No diarrhea.  No blood in his stool.  -Colonoscopy last year that showed diverticulosis.  -Will treat clinically for diverticulitis with recurrent left lower quadrant pain.  -Dietary modifications reviewed.  -ER precautions discussed.  -Follow-up as needed.  Orders:  •  amoxicillin-clavulanate (AUGMENTIN) 875-125 mg per tablet; Take 1 tablet by mouth every 12 (twelve) hours for 10 days         History of Present Illness     Devendra presents today for abdominal pain which has recurred in the past 3 days.  Notes he has had this pain a few months ago very similar nature which resolved after a bowel movement.  He notes this time the pain is about 2 or 3 out of 10 mostly positional and with pressing.  He denies any blood in his stool.  Denies any significant stool changes.  Denies any significant constipation.  Denies fevers, chills, nausea or vomiting.  Denies any urinary discomfort or burning.  He admits he did not travel recently or change any dietary habits.    Abdominal Pain  Pertinent negatives include no arthralgias, dysuria, fever, hematuria or vomiting.     Review of Systems   Constitutional:  Negative for chills and fever.   HENT:  Negative for ear pain and sore throat.    Eyes:  Negative for pain and visual disturbance.   Respiratory:  Negative for cough and shortness of breath.    Cardiovascular:  Negative for chest pain and palpitations.   Gastrointestinal:  Positive for abdominal pain. Negative for vomiting.   Genitourinary:  Negative  "for dysuria and hematuria.   Musculoskeletal:  Negative for arthralgias and back pain.   Skin:  Negative for color change and rash.   Neurological:  Negative for seizures and syncope.   Psychiatric/Behavioral:  Negative for confusion. The patient is not nervous/anxious.    All other systems reviewed and are negative.    Past Medical History[1]  Past Surgical History[2]  Family History[3]  Social History[4]  Medications[5]  No Known Allergies  Immunization History   Administered Date(s) Administered   • COVID-19 PFIZER VACCINE 0.3 ML IM 04/10/2021, 05/03/2021   • Influenza, recombinant, quadrivalent,injectable, preservative free 11/13/2019   • Tdap 11/13/2019     Objective   /80 (BP Location: Left arm, Patient Position: Sitting, Cuff Size: Standard)   Pulse 72   Temp (!) 96.8 °F (36 °C) (Tympanic)   Resp 16   Ht 5' 8.66\" (1.744 m)   Wt 90 kg (198 lb 6.4 oz)   SpO2 98%   BMI 29.59 kg/m²     Physical Exam  Vitals and nursing note reviewed.   Constitutional:       General: He is not in acute distress.     Appearance: Normal appearance.   HENT:      Head: Normocephalic and atraumatic.      Right Ear: Tympanic membrane and external ear normal.      Left Ear: Tympanic membrane and external ear normal.      Nose: Nose normal.      Mouth/Throat:      Mouth: Mucous membranes are moist.     Eyes:      Extraocular Movements: Extraocular movements intact.      Conjunctiva/sclera: Conjunctivae normal.      Pupils: Pupils are equal, round, and reactive to light.       Cardiovascular:      Rate and Rhythm: Normal rate and regular rhythm.      Pulses: Normal pulses.      Heart sounds: No murmur heard.  Pulmonary:      Effort: Pulmonary effort is normal.      Breath sounds: Normal breath sounds. No wheezing, rhonchi or rales.   Abdominal:      General: Bowel sounds are normal.      Palpations: Abdomen is soft.      Tenderness: There is abdominal tenderness (Mild). There is no guarding.     Musculoskeletal:         " General: Normal range of motion.      Cervical back: Normal range of motion.      Right lower leg: No edema.      Left lower leg: No edema.   Lymphadenopathy:      Cervical: No cervical adenopathy.     Skin:     General: Skin is warm.      Capillary Refill: Capillary refill takes less than 2 seconds.     Neurological:      General: No focal deficit present.      Mental Status: He is alert and oriented to person, place, and time.     Psychiatric:         Mood and Affect: Mood normal.         Behavior: Behavior normal.                [1]  Past Medical History:  Diagnosis Date   • Anxiety    • CAD (coronary artery disease)    • Colon polyp    • Hyperlipidemia    • Lyme disease 2019   [2]  Past Surgical History:  Procedure Laterality Date   • COLONOSCOPY     • CORONARY ARTERY BYPASS GRAFT         [3]  Family History  Problem Relation Name Age of Onset   • Diabetes Mother Ramya    • Lung cancer Mother Ramya    • Cancer Mother Ramya         Mother  of lung cancer   • No Known Problems Father     [4]  Social History  Tobacco Use   • Smoking status: Former     Current packs/day: 1.00     Average packs/day: 1 pack/day for 12.0 years (12.0 ttl pk-yrs)     Types: Cigarettes     Passive exposure: Never   • Smokeless tobacco: Never   Vaping Use   • Vaping status: Never Used   Substance and Sexual Activity   • Alcohol use: Never   • Drug use: Never   • Sexual activity: Yes     Partners: Female   [5]  Current Outpatient Medications on File Prior to Visit   Medication Sig   • aspirin (ECOTRIN LOW STRENGTH) 81 mg EC tablet Take 81 mg by mouth in the morning.   • atorvastatin (LIPITOR) 80 mg tablet TAKE 1 TABLET BY MOUTH EVERY DAY   • levothyroxine 75 mcg tablet TAKE 1 TABLET BY MOUTH EVERY DAY   • sertraline (ZOLOFT) 100 mg tablet TAKE 1 TABLET BY MOUTH EVERY DAY   • metFORMIN (GLUCOPHAGE-XR) 750 mg 24 hr tablet Take 1 tablet (750 mg total) by mouth daily with breakfast (Patient not taking: Reported on  7/10/2025)

## 2025-07-17 ENCOUNTER — TELEPHONE (OUTPATIENT)
Age: 65
End: 2025-07-17

## 2025-07-17 NOTE — TELEPHONE ENCOUNTER
Patient called to state that his pharmacy made him aware that his Jardiance medication will need a prior Authorization.  He stated he uses [facility].    Patient was made aware that determination can take 7-21 business days.  Patient expressed understanding.    Please initiate Prior Ath for Jardiance

## 2025-07-18 NOTE — TELEPHONE ENCOUNTER
PA for Empagliflozin 25 MG TABS  SUBMITTED to [facility]    via    [x]CMM-KEY: [id number]   []Surescripts-Case ID #    []Availity-Auth ID #  NDC #    []Faxed to plan   []Other website    []Phone call Case ID #      [x]PA sent as URGENT    All office notes, labs and other pertaining documents and studies sent. Clinical questions answered. Awaiting determination from insurance company.     Turnaround time for your insurance to make a decision on your Prior Authorization can take 7-21 business days.

## 2025-07-21 NOTE — TELEPHONE ENCOUNTER
PA for     Empagliflozin 25 MG TABS   APPROVED     Date(s) approved 7/1/2025 - 7/18/2026    Case # [id number]    Patient advised by          []STAR FESTIVALhart Message  []Phone call   []LMOM  []L/M to call office as no active Communication consent on file  [x]Unable to leave detailed message as VM not approved on Communication consent       Pharmacy advised by    [x]Fax  []Phone call  []Secure Chat    Specialty Pharmacy    []      Approval letter scanned into Media No no letter available at time of approval.

## 2025-08-20 LAB
ALBUMIN SERPL-MCNC: 4.8 G/DL (ref 3.9–4.9)
ALBUMIN/CREAT UR: <10 MG/G CREAT (ref 0–29)
ALP SERPL-CCNC: 56 IU/L (ref 44–121)
ALT SERPL-CCNC: 44 IU/L (ref 0–44)
AST SERPL-CCNC: 33 IU/L (ref 0–40)
BILIRUB SERPL-MCNC: 0.4 MG/DL (ref 0–1.2)
BUN SERPL-MCNC: 20 MG/DL (ref 8–27)
BUN/CREAT SERPL: 17 (ref 10–24)
CALCIUM SERPL-MCNC: 9.5 MG/DL (ref 8.6–10.2)
CHLORIDE SERPL-SCNC: 98 MMOL/L (ref 96–106)
CHOLEST SERPL-MCNC: 145 MG/DL (ref 100–199)
CO2 SERPL-SCNC: 19 MMOL/L (ref 20–29)
CREAT SERPL-MCNC: 1.2 MG/DL (ref 0.76–1.27)
CREAT UR-MCNC: 30.1 MG/DL
EGFR: 68 ML/MIN/1.73
ERYTHROCYTE [DISTWIDTH] IN BLOOD BY AUTOMATED COUNT: 12.3 % (ref 11.6–15.4)
EST. AVERAGE GLUCOSE BLD GHB EST-MCNC: 140 MG/DL
GLOBULIN SER-MCNC: 2.8 G/DL (ref 1.5–4.5)
GLUCOSE SERPL-MCNC: 120 MG/DL (ref 70–99)
HBA1C MFR BLD: 6.5 % (ref 4.8–5.6)
HCT VFR BLD AUTO: 48.2 % (ref 37.5–51)
HDLC SERPL-MCNC: 29 MG/DL
HGB BLD-MCNC: 16.9 G/DL (ref 13–17.7)
LDLC SERPL CALC-MCNC: 63 MG/DL (ref 0–99)
LDLC/HDLC SERPL: 2.2 RATIO (ref 0–3.6)
MCH RBC QN AUTO: 32.4 PG (ref 26.6–33)
MCHC RBC AUTO-ENTMCNC: 35.1 G/DL (ref 31.5–35.7)
MCV RBC AUTO: 93 FL (ref 79–97)
MICROALBUMIN UR-MCNC: <3 UG/ML
PLATELET # BLD AUTO: 156 X10E3/UL (ref 150–450)
POTASSIUM SERPL-SCNC: 4.7 MMOL/L (ref 3.5–5.2)
PROT SERPL-MCNC: 7.6 G/DL (ref 6–8.5)
RBC # BLD AUTO: 5.21 X10E6/UL (ref 4.14–5.8)
SL AMB VLDL CHOLESTEROL CALC: 53 MG/DL (ref 5–40)
SODIUM SERPL-SCNC: 136 MMOL/L (ref 134–144)
TRIGL SERPL-MCNC: 335 MG/DL (ref 0–149)
WBC # BLD AUTO: 6.7 X10E3/UL (ref 3.4–10.8)